# Patient Record
Sex: FEMALE | Race: WHITE
[De-identification: names, ages, dates, MRNs, and addresses within clinical notes are randomized per-mention and may not be internally consistent; named-entity substitution may affect disease eponyms.]

---

## 2019-11-05 ENCOUNTER — HOSPITAL ENCOUNTER (INPATIENT)
Dept: HOSPITAL 75 - ER FS | Age: 74
LOS: 2 days | Discharge: HOME | DRG: 308 | End: 2019-11-07
Attending: INTERNAL MEDICINE | Admitting: INTERNAL MEDICINE
Payer: MEDICARE

## 2019-11-05 VITALS — HEIGHT: 60.98 IN | BODY MASS INDEX: 27.22 KG/M2 | WEIGHT: 144.18 LBS

## 2019-11-05 DIAGNOSIS — Z95.5: ICD-10-CM

## 2019-11-05 DIAGNOSIS — E03.9: ICD-10-CM

## 2019-11-05 DIAGNOSIS — I08.1: ICD-10-CM

## 2019-11-05 DIAGNOSIS — I11.0: ICD-10-CM

## 2019-11-05 DIAGNOSIS — G89.29: ICD-10-CM

## 2019-11-05 DIAGNOSIS — E83.42: ICD-10-CM

## 2019-11-05 DIAGNOSIS — E78.00: ICD-10-CM

## 2019-11-05 DIAGNOSIS — M54.9: ICD-10-CM

## 2019-11-05 DIAGNOSIS — I48.0: Primary | ICD-10-CM

## 2019-11-05 DIAGNOSIS — I25.2: ICD-10-CM

## 2019-11-05 DIAGNOSIS — J44.1: ICD-10-CM

## 2019-11-05 DIAGNOSIS — E87.6: ICD-10-CM

## 2019-11-05 DIAGNOSIS — I25.10: ICD-10-CM

## 2019-11-05 DIAGNOSIS — I25.5: ICD-10-CM

## 2019-11-05 DIAGNOSIS — I50.23: ICD-10-CM

## 2019-11-05 DIAGNOSIS — M19.90: ICD-10-CM

## 2019-11-05 DIAGNOSIS — Z95.810: ICD-10-CM

## 2019-11-05 DIAGNOSIS — I95.9: ICD-10-CM

## 2019-11-05 DIAGNOSIS — Z91.14: ICD-10-CM

## 2019-11-05 DIAGNOSIS — K21.9: ICD-10-CM

## 2019-11-05 PROCEDURE — 36415 COLL VENOUS BLD VENIPUNCTURE: CPT

## 2019-11-05 PROCEDURE — 84484 ASSAY OF TROPONIN QUANT: CPT

## 2019-11-05 PROCEDURE — 83735 ASSAY OF MAGNESIUM: CPT

## 2019-11-05 PROCEDURE — 85027 COMPLETE CBC AUTOMATED: CPT

## 2019-11-05 PROCEDURE — 85610 PROTHROMBIN TIME: CPT

## 2019-11-05 PROCEDURE — 71045 X-RAY EXAM CHEST 1 VIEW: CPT

## 2019-11-05 PROCEDURE — 81000 URINALYSIS NONAUTO W/SCOPE: CPT

## 2019-11-05 PROCEDURE — 85007 BL SMEAR W/DIFF WBC COUNT: CPT

## 2019-11-05 PROCEDURE — 85025 COMPLETE CBC W/AUTO DIFF WBC: CPT

## 2019-11-05 PROCEDURE — 93005 ELECTROCARDIOGRAM TRACING: CPT

## 2019-11-05 PROCEDURE — 94760 N-INVAS EAR/PLS OXIMETRY 1: CPT

## 2019-11-05 PROCEDURE — 80053 COMPREHEN METABOLIC PANEL: CPT

## 2019-11-05 PROCEDURE — 94761 N-INVAS EAR/PLS OXIMETRY MLT: CPT

## 2019-11-05 PROCEDURE — 93306 TTE W/DOPPLER COMPLETE: CPT

## 2019-11-05 PROCEDURE — 84100 ASSAY OF PHOSPHORUS: CPT

## 2019-11-05 PROCEDURE — 83036 HEMOGLOBIN GLYCOSYLATED A1C: CPT

## 2019-11-05 PROCEDURE — 84443 ASSAY THYROID STIM HORMONE: CPT

## 2019-11-05 PROCEDURE — 83880 ASSAY OF NATRIURETIC PEPTIDE: CPT

## 2019-11-05 PROCEDURE — 94640 AIRWAY INHALATION TREATMENT: CPT

## 2019-11-05 PROCEDURE — 80048 BASIC METABOLIC PNL TOTAL CA: CPT

## 2019-11-05 PROCEDURE — 85730 THROMBOPLASTIN TIME PARTIAL: CPT

## 2019-11-05 PROCEDURE — 82962 GLUCOSE BLOOD TEST: CPT

## 2019-11-05 PROCEDURE — 87081 CULTURE SCREEN ONLY: CPT

## 2019-11-05 PROCEDURE — 93041 RHYTHM ECG TRACING: CPT

## 2019-11-06 VITALS — DIASTOLIC BLOOD PRESSURE: 75 MMHG | SYSTOLIC BLOOD PRESSURE: 134 MMHG

## 2019-11-06 VITALS — DIASTOLIC BLOOD PRESSURE: 65 MMHG | SYSTOLIC BLOOD PRESSURE: 121 MMHG

## 2019-11-06 VITALS — SYSTOLIC BLOOD PRESSURE: 130 MMHG | DIASTOLIC BLOOD PRESSURE: 59 MMHG

## 2019-11-06 VITALS — SYSTOLIC BLOOD PRESSURE: 134 MMHG | DIASTOLIC BLOOD PRESSURE: 75 MMHG

## 2019-11-06 VITALS — DIASTOLIC BLOOD PRESSURE: 60 MMHG | SYSTOLIC BLOOD PRESSURE: 111 MMHG

## 2019-11-06 VITALS — DIASTOLIC BLOOD PRESSURE: 81 MMHG | SYSTOLIC BLOOD PRESSURE: 140 MMHG

## 2019-11-06 VITALS — DIASTOLIC BLOOD PRESSURE: 66 MMHG | SYSTOLIC BLOOD PRESSURE: 126 MMHG

## 2019-11-06 VITALS — SYSTOLIC BLOOD PRESSURE: 138 MMHG | DIASTOLIC BLOOD PRESSURE: 70 MMHG

## 2019-11-06 VITALS — DIASTOLIC BLOOD PRESSURE: 68 MMHG | SYSTOLIC BLOOD PRESSURE: 108 MMHG

## 2019-11-06 VITALS — SYSTOLIC BLOOD PRESSURE: 117 MMHG | DIASTOLIC BLOOD PRESSURE: 42 MMHG

## 2019-11-06 VITALS — DIASTOLIC BLOOD PRESSURE: 63 MMHG | SYSTOLIC BLOOD PRESSURE: 127 MMHG

## 2019-11-06 VITALS — SYSTOLIC BLOOD PRESSURE: 110 MMHG | DIASTOLIC BLOOD PRESSURE: 56 MMHG

## 2019-11-06 VITALS — SYSTOLIC BLOOD PRESSURE: 122 MMHG | DIASTOLIC BLOOD PRESSURE: 59 MMHG

## 2019-11-06 VITALS — SYSTOLIC BLOOD PRESSURE: 141 MMHG | DIASTOLIC BLOOD PRESSURE: 53 MMHG

## 2019-11-06 VITALS — DIASTOLIC BLOOD PRESSURE: 80 MMHG | SYSTOLIC BLOOD PRESSURE: 123 MMHG

## 2019-11-06 VITALS — SYSTOLIC BLOOD PRESSURE: 120 MMHG | DIASTOLIC BLOOD PRESSURE: 70 MMHG

## 2019-11-06 VITALS — SYSTOLIC BLOOD PRESSURE: 106 MMHG | DIASTOLIC BLOOD PRESSURE: 53 MMHG

## 2019-11-06 VITALS — DIASTOLIC BLOOD PRESSURE: 67 MMHG | SYSTOLIC BLOOD PRESSURE: 139 MMHG

## 2019-11-06 VITALS — DIASTOLIC BLOOD PRESSURE: 68 MMHG | SYSTOLIC BLOOD PRESSURE: 136 MMHG

## 2019-11-06 VITALS — SYSTOLIC BLOOD PRESSURE: 136 MMHG | DIASTOLIC BLOOD PRESSURE: 71 MMHG

## 2019-11-06 LAB
ALBUMIN SERPL-MCNC: 3.5 GM/DL (ref 3.2–4.5)
ALP SERPL-CCNC: 82 U/L (ref 40–136)
ALT SERPL-CCNC: 9 U/L (ref 0–55)
APTT BLD: 33 SEC (ref 24–35)
APTT PPP: YELLOW S
BACTERIA #/AREA URNS HPF: (no result) /HPF
BASOPHILS # BLD AUTO: 0 10^3/UL (ref 0–0.1)
BASOPHILS # BLD AUTO: 0.1 10^3/UL (ref 0–0.1)
BASOPHILS NFR BLD AUTO: 0 % (ref 0–10)
BASOPHILS NFR BLD AUTO: 1 % (ref 0–10)
BILIRUB SERPL-MCNC: 0.7 MG/DL (ref 0.1–1)
BILIRUB UR QL STRIP: NEGATIVE
BUN/CREAT SERPL: 13
BUN/CREAT SERPL: 14
CALCIUM SERPL-MCNC: 9 MG/DL (ref 8.5–10.1)
CALCIUM SERPL-MCNC: 9.1 MG/DL (ref 8.5–10.1)
CHLORIDE SERPL-SCNC: 104 MMOL/L (ref 98–107)
CHLORIDE SERPL-SCNC: 108 MMOL/L (ref 98–107)
CO2 SERPL-SCNC: 20 MMOL/L (ref 21–32)
CO2 SERPL-SCNC: 22 MMOL/L (ref 21–32)
CREAT SERPL-MCNC: 1.17 MG/DL (ref 0.6–1.3)
CREAT SERPL-MCNC: 1.23 MG/DL (ref 0.6–1.3)
EOSINOPHIL # BLD AUTO: 0 10^3/UL (ref 0–0.3)
EOSINOPHIL # BLD AUTO: 0.1 10^3/UL (ref 0–0.3)
EOSINOPHIL NFR BLD AUTO: 0 % (ref 0–10)
EOSINOPHIL NFR BLD AUTO: 1 % (ref 0–10)
EOSINOPHIL NFR BLD MANUAL: 1 %
ERYTHROCYTE [DISTWIDTH] IN BLOOD BY AUTOMATED COUNT: 13.2 % (ref 10–14.5)
ERYTHROCYTE [DISTWIDTH] IN BLOOD BY AUTOMATED COUNT: 13.4 % (ref 10–14.5)
FIBRINOGEN PPP-MCNC: CLEAR MG/DL
GFR SERPLBLD BASED ON 1.73 SQ M-ARVRAT: 42 ML/MIN
GFR SERPLBLD BASED ON 1.73 SQ M-ARVRAT: 45 ML/MIN
GLUCOSE SERPL-MCNC: 176 MG/DL (ref 70–105)
GLUCOSE SERPL-MCNC: 185 MG/DL (ref 70–105)
GLUCOSE UR STRIP-MCNC: NEGATIVE MG/DL
HCT VFR BLD CALC: 42 % (ref 35–52)
HCT VFR BLD CALC: 44 % (ref 35–52)
HGB BLD-MCNC: 13.6 G/DL (ref 11.5–16)
HGB BLD-MCNC: 14.2 G/DL (ref 11.5–16)
INR PPP: 1 (ref 0.8–1.4)
KETONES UR QL STRIP: NEGATIVE
LEUKOCYTE ESTERASE UR QL STRIP: (no result)
LYMPHOCYTES # BLD AUTO: 2.1 X 10^3 (ref 1–4)
LYMPHOCYTES # BLD AUTO: 3.8 X 10^3 (ref 1–4)
LYMPHOCYTES NFR BLD AUTO: 18 % (ref 12–44)
LYMPHOCYTES NFR BLD AUTO: 24 % (ref 12–44)
MAGNESIUM SERPL-MCNC: 1.7 MG/DL (ref 1.6–2.4)
MAGNESIUM SERPL-MCNC: 1.8 MG/DL (ref 1.6–2.4)
MANUAL DIFFERENTIAL PERFORMED BLD QL: NO
MANUAL DIFFERENTIAL PERFORMED BLD QL: YES
MCH RBC QN AUTO: 31 PG (ref 25–34)
MCH RBC QN AUTO: 31 PG (ref 25–34)
MCHC RBC AUTO-ENTMCNC: 33 G/DL (ref 32–36)
MCHC RBC AUTO-ENTMCNC: 33 G/DL (ref 32–36)
MCV RBC AUTO: 94 FL (ref 80–99)
MCV RBC AUTO: 95 FL (ref 80–99)
MONOCYTES # BLD AUTO: 0.6 X 10^3 (ref 0–1)
MONOCYTES # BLD AUTO: 1 X 10^3 (ref 0–1)
MONOCYTES NFR BLD AUTO: 5 % (ref 0–12)
MONOCYTES NFR BLD AUTO: 6 % (ref 0–12)
MONOCYTES NFR BLD: 8 %
NEUTROPHILS # BLD AUTO: 10.7 X 10^3 (ref 1.8–7.8)
NEUTROPHILS # BLD AUTO: 9 X 10^3 (ref 1.8–7.8)
NEUTROPHILS NFR BLD AUTO: 68 % (ref 42–75)
NEUTROPHILS NFR BLD AUTO: 77 % (ref 42–75)
NEUTS BAND NFR BLD MANUAL: 68 %
NITRITE UR QL STRIP: NEGATIVE
PH UR STRIP: 6 [PH] (ref 5–9)
PHOSPHATE SERPL-MCNC: 3.1 MG/DL (ref 2.3–4.7)
PLATELET # BLD: 266 10^3/UL (ref 130–400)
PLATELET # BLD: 296 10^3/UL (ref 130–400)
PMV BLD AUTO: 11.9 FL (ref 7.4–10.4)
PMV BLD AUTO: 12 FL (ref 7.4–10.4)
POTASSIUM SERPL-SCNC: 3.5 MMOL/L (ref 3.6–5)
POTASSIUM SERPL-SCNC: 3.5 MMOL/L (ref 3.6–5)
PROT SERPL-MCNC: 6.8 GM/DL (ref 6.4–8.2)
PROT UR QL STRIP: (no result)
PROTHROMBIN TIME: 13.3 SEC (ref 12.2–14.7)
RBC #/AREA URNS HPF: (no result) /HPF
RBC MORPH BLD: NORMAL
SODIUM SERPL-SCNC: 141 MMOL/L (ref 135–145)
SODIUM SERPL-SCNC: 142 MMOL/L (ref 135–145)
SP GR UR STRIP: 1.01 (ref 1.02–1.02)
VARIANT LYMPHS NFR BLD MANUAL: 23 %
WBC # BLD AUTO: 11.7 10^3/UL (ref 4.3–11)
WBC # BLD AUTO: 15.6 10^3/UL (ref 4.3–11)
WBC #/AREA URNS HPF: (no result) /HPF

## 2019-11-06 RX ADMIN — FLUTICASONE PROPIONATE SCH SPRAY: 50 SPRAY, METERED NASAL at 15:34

## 2019-11-06 RX ADMIN — INSULIN ASPART SCH UNIT: 100 INJECTION, SOLUTION INTRAVENOUS; SUBCUTANEOUS at 05:46

## 2019-11-06 RX ADMIN — ALBUTEROL SULFATE SCH MG: 2.5 SOLUTION RESPIRATORY (INHALATION) at 10:27

## 2019-11-06 RX ADMIN — MAGNESIUM SULFATE IN DEXTROSE SCH MLS/HR: 10 INJECTION, SOLUTION INTRAVENOUS at 06:50

## 2019-11-06 RX ADMIN — ALBUTEROL SULFATE SCH MG: 2.5 SOLUTION RESPIRATORY (INHALATION) at 20:22

## 2019-11-06 RX ADMIN — INSULIN ASPART SCH UNIT: 100 INJECTION, SOLUTION INTRAVENOUS; SUBCUTANEOUS at 11:47

## 2019-11-06 RX ADMIN — ALBUTEROL SULFATE SCH MG: 2.5 SOLUTION RESPIRATORY (INHALATION) at 21:25

## 2019-11-06 RX ADMIN — FLUTICASONE PROPIONATE AND SALMETEROL XINAFOATE SCH PUFF: 115; 21 AEROSOL, METERED RESPIRATORY (INHALATION) at 21:25

## 2019-11-06 RX ADMIN — LORATADINE SCH MG: 10 TABLET ORAL at 09:10

## 2019-11-06 RX ADMIN — APIXABAN SCH MG: 5 TABLET, FILM COATED ORAL at 19:59

## 2019-11-06 RX ADMIN — FLUTICASONE PROPIONATE AND SALMETEROL XINAFOATE SCH PUFF: 115; 21 AEROSOL, METERED RESPIRATORY (INHALATION) at 06:22

## 2019-11-06 RX ADMIN — ALBUTEROL SULFATE SCH MG: 2.5 SOLUTION RESPIRATORY (INHALATION) at 06:20

## 2019-11-06 RX ADMIN — MAGNESIUM SULFATE IN DEXTROSE SCH MLS/HR: 10 INJECTION, SOLUTION INTRAVENOUS at 09:12

## 2019-11-06 RX ADMIN — CARVEDILOL SCH MG: 12.5 TABLET, FILM COATED ORAL at 20:00

## 2019-11-06 RX ADMIN — ALBUTEROL SULFATE SCH MG: 2.5 SOLUTION RESPIRATORY (INHALATION) at 14:22

## 2019-11-06 NOTE — PULMONARY PROGRESS NOTE
Subjective


Date Seen by a Provider:  Nov 6, 2019


Time Seen by a Provider:  03:35


Subjective/Events-last exam


Patient is a 74 year old female with a past medical history of COPD, 

hypothryroidism, Afib, CAD s/p stent placement x3 over 5 years ago, and 

defibrillator and pacemaker placement who presented to the [facility] ED 11/5/19

after feeling as though her pacemaker was firing and she was having a heart 

attack.   She states that she had a single episode of nausea with associated 

vomiting and felt like she was having a heart attack and her pacemaker was 

firing.  She also experienced shortness of breath at this time.  She denies 

diaphoresis lower extremity edema, and current cough, although she did have a 

cold a few weeks ago.  She denies any lower extremity edema.  She reports that 

she has not been taking her medications for the past week, as she moved in with 

her daughter and she has not reestablished care.  She was previously on 3L of O2

at home, but she has not been on any since moving.  Cardizem and Lovenox were 

administered in the ED, troponins negative, BNP 9776.





Sepsis Event


Evaluation


Height, Weight, BMI


Height: '"


Weight: lbs. oz. kg; 25.58 BMI


Method:





Exam


Exam





Vital Signs








  Date Time  Temp Pulse Resp B/P (MAP) Pulse Ox O2 Delivery O2 Flow Rate FiO2


 


11/6/19 02:23 36.8 85 18 134/75 (94) 95 Nasal Cannula 2.00 


 


11/6/19 01:14 36.7 92 12 127/67 97 Nasal Cannula 2.00 


 


11/6/19 00:24 36.97136 123 45 120/72  Room Air 2.00 


 


11/6/19 00:07       2.0 


 


11/6/19 00:07     97 Nasal Cannula 2.00 


 


11/5/19 23:56 36.8 140 12 120/72 (88) 97 Nasal Cannula  














I & O 


 


 11/6/19





 07:00


 


Intake Total 300 ml


 


Balance 300 ml








Height & Weight


Height: '"


Weight: lbs. oz. kg; 25.58 BMI


Method:


General Appearance:  No Apparent Distress, Thin


HEENT:  PERRL/EOMI


Neck:  Full Range of Motion, Non Tender, Supple


Respiratory:  Chest Non Tender, No Accessory Muscle Use, No Respiratory 

Distress, Crackles (bases), Decreased Breath Sounds, Rhonci (bases)


Cardiovascular:  Regular Rate, Rhythm, No JVD, Normal Peripheral Pulses


Capillary Refill:  Less Than 3 Seconds


Peripheral Pulses:  2+ Dorsalis Pedis (R), 2+ Left Dors-Pedis (L), 2+ Radial 

Pulses (R), 2+ Radial Pulses (L)


Gastrointestinal:  normal bowel sounds, non tender, soft


Extremity:  Normal Range of Motion, Non Tender, No Pedal Edema


Neurologic/Psychiatric:  Alert, Oriented x3, No Motor/Sensory Deficits


Skin:  Normal Color, Warm/Dry





Results


Lab





11/6/19 00:05








BNP 9776





Assessment/Plan


Assessment/Plan


Afib with RVR 


   -Cardizem drip started in ED


   -Lovenox


Acute CHF exacerbation


   -BNP 9776


   -Consult cardiology 


   -echo 


   -Lasix 40mg, monitor electrolytes 


Hypokalemia 


   -replace with 50meq 


Hypomagnesium


   -replace with 4gm 


Hx hypotension 


   -currently normotensive 


Hx of hypothyroid 


   -check TSH


Hx of COPD 


   -previously on 3L home O2











NICK ROSS MED STUDENT     Nov 6, 2019 03:40


POS

## 2019-11-06 NOTE — DIAGNOSTIC IMAGING REPORT
Portable erect AP chest at 1210 hours.



INDICATION: Cough.



There are no prior studies available for comparison.



FINDINGS: The heart is enlarged and there is a defibrillator

device in place on the left. The leads seem to be in good

position. The lungs are clear. There is no evidence for failure,

pneumonia or for pleural effusion. The mediastinum is not

widened. The osseous structures are intact. Surgical clips are

evident over the left upper quadrant and the right mid abdomen.



IMPRESSION:



There is cardiomegaly but there is no evidence for an acute

cardiopulmonary abnormality.



Dictated by: 



  Dictated on workstation # [id number]

## 2019-11-06 NOTE — NUR
This nurse called SBAR report to Diana RN on 4th floor, patient is going to be 
transferring to room 418. Patient will be transferred with telemetry.l

## 2019-11-06 NOTE — HISTORY & PHYSICAL-HOSPITALIST
History of Present Illness


HPI/Chief Complaint


Pt is a 74yoCF with a PMH of a-fib, CHF, AICD placement, and COPD who presented 

to the ER due to her defibrillator shocking her.  Patient states she has had a 

cough and shortness of breath for about a week.  Last night she had 4 episodes 

of sharp shocklike chest pain and she thought that her defibrillator shocking 

her.  After the fourth episode her daughter called EMS for evaluation.  She has 

no history of aberrant defibrillations.  On EMS arrival she was found to be in 

A. fib with RVR and was given 20 mg of Cardizem.  On arrival to the emergency 

room she remains in rapid ventricular rate and was started on a cardizem gtt. 

Her rate reamined elevated and she was then given digoxin x1. She was then 

transferred here for ICU level care and cardiology evaluation.  She reports that

she recently moved from [place] to [place] and has not established care 

with any physicians.  Because of this she has been out of her medications for 

which she states is a couple of weeks. Reviewed of fill history shows she's been

out of medications for over a month though. She is also unsure of any of her 

medications. At this time she states she is feeling well and is actually 

requesting discharge home to be with her dog.


Source:  patient


Date Seen


11/6/19


Time Seen by a Provider:  08:15


Attending Physician


Fred Meyers MD


PCP


No,Local Physician


Referring Physician





Date of Admission


Nov 6, 2019 at 01:19





Home Medications & Allergies


Home Medications


Reviewed patient Home Medication Reconciliation performed by pharmacy medication

reconciliations technician and/or nursing.


Patients Allergies have been reviewed.





Allergies





Allergies


Coded Allergies


  No Known Drug Allergies ([id number]/6/19)








Past Medical-Social-Family Hx


Past Med/Social Hx:  Reviewed Nursing Past Med/Soc Hx


Patient Social History


Employed/Student:  retired


Alcohol Use:  Denies Use


Recreational Drug Use:  No


Smoking Status:  Former Smoker


Physical Abuse Screen:  No


Sexual Abuse:  No


Recent Foreign Travel:  No


Contact w/other who traveled:  No


Recent Hopitalizations:  No


Recent Infectious Disease Expo:  No





Immunizations Up To Date


Date of Pneumonia Vaccine:  Nov 1, 2017





Seasonal Allergies


Seasonal Allergies:  Yes





Past Medical History


Surgeries:  Coronary Stent, Hysterectomy, Pacemaker


Respiratory:  COPD


Currently Using CPAP:  No


Currently Using BIPAP:  No


Cardiac:  Atrial Fibrillation, Cardiomyopathy, Coronary Artery Disease, 

Hypertension, Irregular Heartbeat


Hysterectomy


Gastrointestinal:  Gastroesophageal Reflux, Chronic Diarrhea


Musculoskeletal:  Arthritis, Chronic Back Pain


Endocrine:  Hypothyroidsim


History of Blood Disorders:  No





Family History


Reviewed Nursing Family Hx


Cancer





Review of Systems


Constitutional:  no symptoms reported


EENTM:  no symptoms reported


Respiratory:  No cough, No dyspnea on exertion, No short of breath


Cardiovascular:  see HPI; No chest pain, No edema; Hx of Intervention; No 

palpitations


Gastrointestinal:  no symptoms reported; No abdominal pain


Musculoskeletal:  no symptoms reported


Skin:  no symptoms reported


Psychiatric/Neurological:  No Symptoms Reported





Physical Exam


Physical Exam


Vital Signs





Vital Signs - First Documented








 11/5/19 11/6/19





 23:56 03:32


 


Temp 36.8 


 


Pulse 140 


 


Resp 12 


 


B/P (MAP) 120/72 (88) 


 


Pulse Ox 97 


 


O2 Delivery Nasal Cannula 


 


FiO2  28





Capillary Refill : Less Than 3 Seconds


Height, Weight, BMI


Height: '"


Weight: lbs. oz. kg; 25.58 BMI


Method:


General Appearance:  No Apparent Distress, WD/WN, Chronically ill, Thin


Neck:  Normal Inspection, Supple; No Thyromegaly


Respiratory:  Lungs Clear, No Accessory Muscle Use, No Respiratory Distress


Cardiovascular:  Regular Rate, Rhythm, No Murmur


Gastrointestinal:  Normal Bowel Sounds, Non Tender, Soft


Extremity:  No Calf Tenderness, No Pedal Edema


Neurologic/Psychiatric:  Alert, Oriented x3, Normal Mood/Affect


Skin:  Normal Color, Warm/Dry





Results


Results/Procedures


Labs


Laboratory Tests


11/6/19 00:05








11/6/19 03:15








Patient resulted labs reviewed.


Imaging:  Reviewed Imaging Report





Assessment/Plan


Admission Diagnosis


a-fib with RVR


Admission Status:  Inpatient Order (span 2 midnights)


Reason for Inpatient Admission:  


defibrillator shock, on cardizem gtt, need cardiology evaluation





Assessment and Plan


Atrial fibrillation with RVR


Ischemic Cardiomyopathy


   Converted to sinus rhythm en route so off cardizem gtt


   On Xarelto


   Cardiology consulted, appreciate recs


   Echo ordered


   interrogate pacemaker


   





HTN


   Well controlled, trend





COPD


   Does not wear oxygen at home, I titrated off at bedside and tolerated well


   MAT protocol


   Pulm consulted, appreciate recs


   Advair





Medication noncompliance


    consulted


   Discussed with [facility] who called pharmacy and refills available, she 

will have them transferred to [facility] here, appreciate assistance





DVT ppx: anticoagulation as above





Diagnosis/Problems


Diagnosis/Problems





(1) Atrial fibrillation with RVR


Status:  Acute


(2) COPD (chronic obstructive pulmonary disease)


Qualifiers:  


   COPD type:  unspecified COPD  Qualified Codes:  J44.9 - Chronic obstructive 

pulmonary disease, unspecified


(3) CAD (coronary artery disease)


Status:  Chronic


Qualifiers:  


   Coronary Disease-Associated Artery/Lesion type:  native artery  Native vs. 

transplanted heart:  native heart  Associated angina:  without angina  Qualified

Codes:  I25.10 - Atherosclerotic heart disease of native coronary artery without

angina pectoris


(4) Hypothyroidism


Status:  Chronic


Qualifiers:  


   Hypothyroidism type:  unspecified  Qualified Codes:  E03.9 - Hypothyroidism, 

unspecified


(5) Non-compliance


Status:  Acute


(6) CHF (congestive heart failure)


Status:  Chronic


Qualifiers:  


   Heart failure type:  systolic  Heart failure chronicity:  chronic  Qualified 

Codes:  I50.22 - Chronic systolic (congestive) heart failure





Clinical Quality Measures


AMI/AHF:


ASA po Prior to arrival:  No





DVT/VTE Risk/Contraindication:


RFS Level Per Nursing on Admit:  2=Moderate











SMAANTHA WALTON MD               Nov 6, 2019 13:01


POS

## 2019-11-06 NOTE — NUR
Received report from ERIC Allison on this patient. Patient arrived on the floor at 1500. I agree 
with previous nurse's assessment, assumed care of patient at this time.

## 2019-11-06 NOTE — ED CARDIAC GENERAL
History of Present Illness


General


Chief Complaint:  Chest Pain


Stated Complaint:  CHEST PAIN


Source:  patient, EMS





History of Present Illness


Date Seen by Provider:  Nov 5, 2019


Time Seen by Provider:  23:51


Initial Comments


74-year-old female presenting with increased cough and shortness of breath over 

the last week as she has been out of her medications. She recently moved here 

from [facility]. She is living with her daughter. She has not 

established with a new physician yet. She has a history of COPD, hypothyroidism,

atrial fibrillation with a implanted defibrillator and pacemaker, heart failure,

history of 3 stents in her heart. She reports coughing up thick yellow sputum in

the last several days. She used to be on oxygen but says that they took it away 

from her a few months ago. She felt like her defibrillator was firing on her ea

rlier this evening. EMS was activated to come see her when the defibrillator was

firing. Family history given her nitroglycerin for her defibrillator firing but 

it did not help. She was given 20 mg of diltiazem when she was found to be in 

atrial fibrillation with RVR. She was still in A. fib with RVR on arrival to the

emergency department. She denies having any pain in her chest but states that 

she does have tightness and has been wheezing and short of breath. She has no 

swelling in her legs.





Allergies and Home Medications


Allergies


Coded Allergies:  


     No Known Drug Allergies (Unverified , 11/6/19)





Patient Home Medication List


Home Medication List Reviewed:  Yes





Review of Systems


Review of Systems


Constitutional:  No chills, No fever; malaise


EENTM:  No Nose Congestion, No Nose Pain


Respiratory:  Cough, Shortness of Air, SOA With Exertion, SOA at Rest; Denies 

Stridor; Wheezing


Cardiovascular:  Chest Pain (tightness); Denies Edema; Irregular Heart Rate, 

Palpitations; Denies Syncope


Gastrointestinal:  Denies Nausea, Denies Vomiting


Genitourinary:  No Symptoms Reported


Musculoskeletal:  no symptoms reported


Skin:  no symptoms reported


Psychiatric/Neurological:  No Symptoms Reported


Endocrine:  No Symptoms Reported





Past Medical-Social-Family Hx


Patient Social History


Recent Foreign Travel:  No


Contact w/Someone Who Travel:  No





Past Medical History


Surgeries:  Yes


Coronary Stent, Defibrillator


Respiratory:  Yes


COPD


Cardiac:  Yes


Atrial Fibrillation, Coronary Artery Disease, High Cholesterol, Hypertension, 

Irregular Heartbeat


Endocrine:  Yes


Hypothyroidsim





Physical Exam


Vital Signs





Vital Signs - First Documented








 11/5/19





 23:56


 


Temp 36.8


 


Pulse 140


 


Resp 12


 


B/P (MAP) 120/72 (88)


 


Pulse Ox 97


 


O2 Delivery Nasal Cannula





Capillary Refill :


Height, Weight, BMI


Height: '"


Weight: lbs. oz. kg;  BMI


Method:


General Appearance:  No Apparent Distress, Thin


HEENT:  PERRL/EOMI, Pharynx Normal


Neck:  Full Range of Motion, Normal Inspection, Non Tender, Supple


Respiratory:  Chest Non Tender, No Accessory Muscle Use, No Respiratory 

Distress, Crackles (bases), Decreased Breath Sounds, Rhonci (bases)


Cardiovascular:  No JVD, Normal Peripheral Pulses, Irregularly Irregular, 

Tachycardia


Gastrointestinal:  Normal Bowel Sounds, No Pulsatile Mass, Non Tender, Soft


Rectal:  Deferred


Extremity:  Normal Range of Motion, Non Tender, No Calf Tenderness


Neurologic/Psychiatric:  Alert, Oriented x3, No Motor/Sensory Deficits


Skin:  Normal Color, Warm/Dry





Progress/Results/Core Measures


Results/Orders


Lab Results





Laboratory Tests








Test


 11/6/19


00:05 Range/Units


 


 


White Blood Count


 15.6 H


 4.3-11.0


10^3/uL


 


Red Blood Count


 4.59 


 4.35-5.85


10^6/uL


 


Hemoglobin 14.2  11.5-16.0  G/DL


 


Hematocrit 44  35-52  %


 


Mean Corpuscular Volume 95  80-99  FL


 


Mean Corpuscular Hemoglobin 31  25-34  PG


 


Mean Corpuscular Hemoglobin


Concent 33 


 32-36  G/DL





 


Red Cell Distribution Width 13.2  10.0-14.5  %


 


Platelet Count


 296 


 130-400


10^3/uL


 


Mean Platelet Volume 12.0 H 7.4-10.4  FL


 


Neutrophils (%) (Auto) 68  42-75  %


 


Lymphocytes (%) (Auto) 24  12-44  %


 


Monocytes (%) (Auto) 6  0-12  %


 


Eosinophils (%) (Auto) 1  0-10  %


 


Basophils (%) (Auto) 1  0-10  %


 


Neutrophils # (Auto) 10.7 H 1.8-7.8  X 10^3


 


Lymphocytes # (Auto) 3.8  1.0-4.0  X 10^3


 


Monocytes # (Auto) 1.0  0.0-1.0  X 10^3


 


Eosinophils # (Auto)


 0.1 


 0.0-0.3


10^3/uL


 


Basophils # (Auto)


 0.1 


 0.0-0.1


10^3/uL


 


Neutrophils % (Manual) 68   %


 


Lymphocytes % (Manual) 23   %


 


Monocytes % (Manual) 8   %


 


Eosinophils % (Manual) 1   %


 


Blood Morphology Comment NORMAL   


 


Prothrombin Time 13.3  12.2-14.7  SEC


 


INR Comment 1.0  0.8-1.4  


 


Activated Partial


Thromboplast Time 33 


 24-35  SEC





 


Sodium Level 142  135-145  MMOL/L


 


Potassium Level 3.5 L 3.6-5.0  MMOL/L


 


Chloride Level 104    MMOL/L


 


Carbon Dioxide Level 22  21-32  MMOL/L


 


Anion Gap 16 H 5-14  MMOL/L


 


Blood Urea Nitrogen 15  7-18  MG/DL


 


Creatinine


 1.17 


 0.60-1.30


MG/DL


 


Estimat Glomerular Filtration


Rate 45 


  





 


BUN/Creatinine Ratio 13   


 


Glucose Level 176 H   MG/DL


 


Calcium Level 9.0  8.5-10.1  MG/DL


 


Corrected Calcium 9.4  8.5-10.1  MG/DL


 


Magnesium Level 1.8  1.6-2.4  MG/DL


 


Total Bilirubin 0.7  0.1-1.0  MG/DL


 


Aspartate Amino Transf


(AST/SGOT) 14 


 5-34  U/L





 


Alanine Aminotransferase


(ALT/SGPT) 9 


 0-55  U/L





 


Alkaline Phosphatase 82    U/L


 


Troponin I < 0.30  <0.30  NG/ML


 


Pro-B-Type Natriuretic Peptide 9776.0 H <75.0  PG/ML


 


Total Protein 6.8  6.4-8.2  GM/DL


 


Albumin 3.5  3.2-4.5  GM/DL








My Orders





Orders - KATHY CARTAGENA MD


Cbc With Automated Diff (11/6/19 00:04)


Magnesium (11/6/19 00:04)


Chest 1 View Ap/Pa Only (11/6/19 00:04)


Ekg Tracing (11/6/19 00:04)


Comprehensive Metabolic Panel (11/6/19 00:04)


Protime With Inr (11/6/19 00:04)


Partial Thromboplastin Time (11/6/19 00:04)


O2 (11/6/19 00:04)


Monitor-Rhythm Ecg Trace Only (11/6/19 00:04)


Ed Iv/Invasive Line Start (11/6/19 00:04)


Troponin I Fs (11/6/19 00:04)


Probnp Fs (11/6/19 00:04)


Sputum Culture (11/6/19 00:04)


Ns (Ivpb) (Sodium C... W/Diltiazem Iv Fo (11/6/19 00:06)


Enoxaparin Injection (Lovenox Injection) (11/6/19 00:14)


Manual Differential (11/6/19 00:05)


Digoxin Injection (Lanoxin Injection) (11/6/19 00:50)





Vital Signs/I&O











 11/5/19 11/6/19 11/6/19 11/6/19





 23:56 00:07 00:07 00:24


 


Temp 36.8   36.23540


 


Pulse 140   123


 


Resp 12   45


 


B/P (MAP) 120/72 (88)   120/72


 


Pulse Ox 97 97  


 


O2 Delivery Nasal Cannula Nasal Cannula  Room Air


 


O2 Flow Rate  2.00 2.0 2.00


 


    





 11/6/19   





 01:14   


 


Temp 36.7   


 


Pulse 92   


 


Resp 12   


 


B/P (MAP) 127/67   


 


Pulse Ox 97   


 


O2 Delivery Nasal Cannula   


 


O2 Flow Rate 2.00   














 11/6/19





 00:00


 


Intake Total 300 ml


 


Balance 300 ml











Progress


Progress Note #1:  


Progress Note


Obtain labs as well as cardiac enzymes, electrocardiogram, chest x-ray. Since 

she has not had good rate control with the bolus of diltiazem 20 mg IV by EMS 

Will place her on the trip. She has not had any of her cardiac medications for 

over a week. She also has had recent upper respiratory infection and cough for 

COPD-type exacerbation which could also be a source for making her go into 

atrial fibrillation with RVR. Her heart rate is currently fluctuating anywhere 

from 100-160 bpm


Progress Note #2:  


Progress Note


Labs show an elevated BNP. The troponin is negative. Her CBC does show mild 

elevation of her white blood cell count but the differential was normal. Her 

chemistry is showing mild decrease in her potassium at 3.5. Her creatinine is 

mildly elevated at 1.17 with a GFR 45. Her chest x-ray shows increased pulmonary

vascular congestion and COPD changes with cardiomegaly. Will discuss with Dr. Meyers the on-call doctor for the hospitalist, since the patient is unassigned 

and does not have a local doctor, for the admission. He accepted the patient for

admission. Will give a dose of digoxin in addition to the diltiazem drip since 

the diltiazem drip alone is not controlling her rate. She has been given a dose 

of Lovenox to help with thinning her blood. Consult Dr. Diamond with Cardiology 

in the AM.


Initial ECG Impression Date:  Nov 5, 2019


Initial ECG Impression Time:  23:56


Initial ECG Rate:  100


Initial ECG Rhythm:  A Fib/Flutter


Initial ECG Comparisson:  No Previous ECG Available


Comment


Atrial fibrillation with a left bundle-branch block. Heart rate 100 bpm. QT 

interval 372 ms and a QT corrected interval 480 ms. There are no prior tracing 

available for comparison.





Diagnostic Imaging





   Diagonstic Imaging:  Xray


   Plain Films/CT/US/NM/MRI:  chest


Comments


On my review of her 1 view chest x-ray she has increased pulmonary vascular 

congestion, cardiomegaly, COPD changes with flattening of her diaphragms.


   Reviewed:  Reviewed by Me





Departure


Communication (Admissions)


Time/Spoke to Admitting Phy:  00:47


Discussed with Dr. Celaya for the hospitalist service since the patient is 

unassigned and he accepted the patient for admission. Will continue with the 

diltiazem drip and give a dose of digoxin since she has been off of that. Will 

also give a dose of Lasix but will defer that until she is in the ICU since she 

is maintaining good sats and that way she does not have to have a dose of 

diuretic prior to transfer in the ambulance. Will consult cardiology in the a.m.

for further management and establishment of care for the patient.





Impression





   Primary Impression:  


   Atrial fibrillation with RVR


   Additional Impressions:  


   COPD with exacerbation


   CHF exacerbation


   Qualified Codes:  I50.9 - Heart failure, unspecified


Disposition:  09 ADMITTED AS INPATIENT


Condition:  Stable





Admissions


Decision to Admit Reason:  Admit from ER (General)


Decision to Admit/Date:  Nov 6, 2019


Time/Decision to Admit Time:  00:47





Departure-Patient Inst.


Referrals:  


NO,LOCAL PHYSICIAN (PCP)


Primary Care Physician











KATHY CARTAGENA MD                Nov 6, 2019 00:12


POS

## 2019-11-06 NOTE — NUR
CM/SS spoke with the patient in regards to SS consult. Patient does state that she will have 
difficulty getting meds at discharge. She stated she just moved here from [place] and needs to 
get a [place] Medicaid card. She believes her daughter has filled out an application for [place] but 
is unsure. Patient gave permission to speak with her daughter Virginia. Patient also stated 
she had been on Hospice with [facility] in [facility].

## 2019-11-06 NOTE — DIAGNOSTIC IMAGING REPORT
EXAMINATION: Portable erect AP chest at 3:42 AM



INDICATION: Atrial fibrillation



The cardiomegaly and the left-sided defibrillator device seen on

the exam performed earlier today at 12:10 AM are again evident

and no different. The central pulmonary vascularity may be

somewhat more prominent than on the prior study but there is

still no sign of failure, pneumonia or for a significant pleural

effusion. The mediastinum is not widened. The osseous structures

are intact.



IMPRESSION: When compared to the prior study, there has been no

significant change. There is no acute cardiopulmonary abnormality

noted.



Dictated by: 



  Dictated on workstation # [id number]

## 2019-11-06 NOTE — CONSULTATION-CARDIOLOGY
HPI-Cardiology


Cardiology Consultation:


Date of Consultation


19


Time Seen by a Provider:  08:45


Date of Admission


19


Attending Physician


Fred Meyers MD


Admitting Physician


No,Local Physician


Consulting Physician


Gisselle Diamond MD





HPI:


Chief Complaint:


Palpitations


Dyspnea


Ms. Shannon is a 74 year old female admitted to ICU 1 from [facility].  She 

reports she had previously been living in [place], [place] and has recently 

moved in with her daughter in [place], [place].  She states she has been without he

r medications for approx the last 2 weeks d/t running out of her medications and

not having established with a local PCP as of yet.  She states yesterday morning

she began having n/v/d.  She reports progressive gen weakness and dyspnea.  She 

states she felt as though her device had shocked her maybe 3-4 times, but she is

not sure.  She reports developing sharp stabbing chest pain which was constant 

for several hours that would come and go in intensity.  She is currently pain 

free and feels her SOB has improved and the palpitations have resolved.  She 

reports chronic ankle swelling.  She denies any fever or chills.  She states she

take a blood thinner, but is unsure of the name of it or any of her medications.





Review of Systems-Cardiology


Review of Systems


Constitutional:  No chills, No fever; malaise


Eyes:  No vision change


Ears/Nose/Throat:  No epistaxis, No recent hearing loss


Respiratory:  As described under HPI


Cardiovascular:  As described under HPI


Gastrointestinal:  As described under HPI


Genitourinary:  No dysuria, No hematuria


Musculoskeletal:  back pain (chronic)


Skin:  No rash on exposed areas, No ulcerations on exposed areas


Psychiatric/Neurological:  No anxiety, No depression, No focal weakness, No 

syncope


Hematologic:  No bleeding abnormalities





PMH-Social-Family Hx


Patient Social History


Alcohol Use:  Denies Use


Recreational Drug Use:  No


Recent Foreign Travel:  No


Recent Infectious Disease Expo:  No


Hospitalization with Isolation:  Denies


Physical Abuse Screen:  No


Sexual Abuse:  No





Immunizations Up To Date


Date of Pneumonia Vaccine:  2017





Past Medical History


PMH


As described under Assessment.





Family Medical History


Family Medical History:  


She reports no known family h/o CAD or SCD.  She states all her family


members have had cancer.





Allergies and Home Medications


Allergies


Coded Allergies:  


     No Known Drug Allergies (Unverified , 19)





Physical Exam-Cardiology


Physical Exam


Vital Signs/I&O











 19





 23:56 00:07 00:07 00:24


 


Temp 36.8   [phone]


 


Pulse 140   123


 


Resp 12   45


 


B/P (MAP) 120/72 (88)   120/72


 


Pulse Ox 97 97  


 


O2 Delivery Nasal Cannula Nasal Cannula  Room Air


 


O2 Flow Rate  2.00 2.0 2.00


 


    





 19





 01:14 02:23 02:27 02:30


 


Temp 36.7 36.8  


 


Pulse 92 85 79 


 


Resp 12 18  


 


B/P (MAP) 127/67 134/75 (94)  


 


Pulse Ox 97 95  


 


O2 Delivery Nasal Cannula Nasal Cannula  Nasal Cannula


 


O2 Flow Rate 2.00 2.00  2.00


 


    





 19





 02:30 02:45 03:00 03:15


 


Pulse 77 75 71 67


 


Resp 19 18 18 14


 


B/P (MAP) 138/70 (92) 140/81 (100)  108/68 (81)


 


Pulse Ox 96 97 96 98


 


O2 Delivery Nasal Cannula Nasal Cannula Nasal Cannula Nasal Cannula


 


O2 Flow Rate 2.00 2.00 2.00 2.00





 19





 03:30 03:32 03:45 04:00


 


Temp  36.8  


 


Pulse 65 85 67 94


 


Resp 20  16 10


 


B/P (MAP) 127/63 (84)  136/68 (90) 123/80 (94)


 


Pulse Ox 97 95 96 98


 


O2 Delivery Nasal Cannula  Nasal Cannula Nasal Cannula


 


O2 Flow Rate 2.00  2.00 2.00


 


FiO2  28  


 


    





 19





 04:00 05:00 06:00 06:20


 


Pulse  63 67 


 


Resp  23 15 


 


B/P (MAP)  122/59 (80) 126/66 (86) 


 


Pulse Ox 97 96 96 97


 


O2 Delivery Nasal Cannula Nasal Cannula Nasal Cannula Nasal Cannula


 


O2 Flow Rate 2.00 2.00 2.00 2.00





 19 





 06:55 07:00 08:00 


 


Pulse 64 77 72 


 


Resp  18 22 


 


B/P (MAP)  111/60 (77) 130/59 (82) 


 


Pulse Ox  99 95 


 


O2 Delivery  Nasal Cannula Nasal Cannula 


 


O2 Flow Rate  2.00 2.00 














 19





 00:00


 


Intake Total 300 ml


 


Balance 300 ml





Capillary Refill : Less Than 3 Seconds


Constitutional:  AAO x 3, well-developed, well-nourished


HEENT:  PERRL, hearing is well preserved


Neck:  No carotid bruit; carotid pulses are 2 + bilaterally


Respiratory:  crackles (coarse, bilat)


Cardiovascular:  regular rate-rhythm; No JVD; S1 and S2


Gastrointestinal:  soft, round, audible bowel sounds


Genital/Rectal:  other (indwelling urinary cather to DD; clear, yellow)


Extremities:  no lower extremity edema bilateral


Neurologic/Psychiatric:  grossly intact, power is 5/5 both on sides


Skin:  No rash on exposed areas, No ulcerations on exposed areas





Data Review


Labs


Laboratory Tests


19 00:05: 


White Blood Count 15.6H, Red Blood Count 4.59, Hemoglobin 14.2, Hematocrit 44, 

Mean Corpuscular Volume 95, Mean Corpuscular Hemoglobin 31, Mean Corpuscular 

Hemoglobin Concent 33, Red Cell Distribution Width 13.2, Platelet Count 296, 

Mean Platelet Volume 12.0H, Neutrophils (%) (Auto) 68, Lymphocytes (%) (Auto) 

24, Monocytes (%) (Auto) 6, Eosinophils (%) (Auto) 1, Basophils (%) (Auto) 1, 

Neutrophils # (Auto) 10.7H, Lymphocytes # (Auto) 3.8, Monocytes # (Auto) 1.0, 

Eosinophils # (Auto) 0.1, Basophils # (Auto) 0.1, Neutrophils % (Manual) 68, 

Lymphocytes % (Manual) 23, Monocytes % (Manual) 8, Eosinophils % (Manual) 1, 

Blood Morphology Comment NORMAL, Prothrombin Time 13.3, INR Comment 1.0, 

Activated Partial Thromboplast Time 33, Sodium Level 142, Potassium Level 3.5L, 

Chloride Level 104, Carbon Dioxide Level 22, Anion Gap 16H, Blood Urea Nitrogen 

15, Creatinine 1.17, Estimat Glomerular Filtration Rate 45, BUN/Creatinine Ratio

13, Glucose Level 176H, Calcium Level 9.0, Corrected Calcium 9.4, Magnesium 

Level 1.8, Total Bilirubin 0.7, Aspartate Amino Transf (AST/SGOT) 14, Alanine 

Aminotransferase (ALT/SGPT) 9, Alkaline Phosphatase 82, Troponin I < 0.30, 

Pro-B-Type Natriuretic Peptide 9776.0H, Total Protein 6.8, Albumin 3.5


19 02:31: 


Urine Color YELLOW, Urine Clarity CLEAR, Urine pH 6, Urine Specific Gravity 

1.015L, Urine Protein 3+H, Urine Glucose (UA) NEGATIVE, Urine Ketones NEGATIVE, 

Urine Nitrite NEGATIVE, Urine Bilirubin NEGATIVE, Urine Urobilinogen 4H, Urine 

Leukocyte Esterase 1+H, Urine RBC (Auto) NEGATIVE, Urine RBC NONE, Urine WBC 0-

2, Urine Squamous Epithelial Cells 10-25H, Urine Crystals NONE, Urine Bacteria 

FEWH, Urine Casts NONE, Urine Mucus NEGATIVE, Urine Culture Indicated NO


19 03:15: 


White Blood Count 11.7H, Red Blood Count 4.42, Hemoglobin 13.6, Hematocrit 42, 

Mean Corpuscular Volume 94, Mean Corpuscular Hemoglobin 31, Mean Corpuscular 

Hemoglobin Concent 33, Red Cell Distribution Width 13.4, Platelet Count 266, 

Mean Platelet Volume 11.9H, Neutrophils (%) (Auto) 77H, Lymphocytes (%) (Auto) 

18, Monocytes (%) (Auto) 5, Eosinophils (%) (Auto) 0, Basophils (%) (Auto) 0, 

Neutrophils # (Auto) 9.0H, Lymphocytes # (Auto) 2.1, Monocytes # (Auto) 0.6, 

Eosinophils # (Auto) 0.0, Basophils # (Auto) 0.0, Sodium Level 141, Potassium 

Level 3.5L, Chloride Level 108H, Carbon Dioxide Level 20L, Anion Gap 13, Blood 

Urea Nitrogen 17, Creatinine 1.23, Estimat Glomerular Filtration Rate 42, 

BUN/Creatinine Ratio 14, Glucose Level 185H, Calcium Level 9.1, Magnesium Level 

1.7, Phosphorus Level 3.1, Thyroid Stimulating Hormone (TSH) 2.01





Laboratory Tests


19 00:05








19 03:15











Radiology


NAME:   LUIS A SHANNON


[facility] REC#:   [id number]


ACCOUNT#:   [id number]


PT STATUS:   ADM IN


:   1945


PHYSICIAN:   PAULINA RAMÍREZ DO


ADMIT DATE:   19/ICU


***Draft***


Date of Exam:19





CHEST 1 VIEW, AP/PA ONLY








EXAMINATION: Portable erect AP chest at 3:42 AM





INDICATION: Atrial fibrillation





The cardiomegaly and the left-sided defibrillator device seen on


the exam performed earlier today at 12:10 AM are again evident


and no different. The central pulmonary vascularity may be


somewhat more prominent than on the prior study but there is


still no sign of failure, pneumonia or for a significant pleural


effusion. The mediastinum is not widened. The osseous structures


are intact.





IMPRESSION: When compared to the prior study, there has been no


significant change. There is no acute cardiopulmonary abnormality


noted.





  Dictated on workstation # [id number]








Dict:   [id number]


Trans:   [id number]


[facility] [id number]





Interpreted by:     GORDO CLEMONS MD


Electronically signed by:





A/P-Cardiology


Assessment/Admission Diagnosis


H/O PAF - A-fib with RVR - currently SR





Echocardiogram of 2019 showed LVEF 20-25%.  Mod MR.  Mod TR.  Grade 1 

diastolic dysfunction.  PASP 50mmHg





ICM with acute on chronic systolic CHF





Chronic OAC - unsure of which OAC





H/O MI with stent placement x 3 in  at [facility] - details 

unknown





AICD present - placed in Aug 2014 by Dr. Zepeda at [facility] - 

[employer] - details unknown, likely d/t ICM based on echo carried out 

today





HTN





HLD





COPD - quit smoking approx 40 years ago





N/V/D of undetermined etiology - medical services following





Electrolyte abnormalities - replace





Reports h/o thyroid cancer - details unknown





Poor historian





Discussion and Recomendations


A-fib with RVR - currently SR with controlled rate - reports h/o PAF


Echocardiogram shows LVEF 20-25% - treat with ACE, BB, and diuretics


Defib present - likely d/t ICM - exact details unknown - [place] called to 

interrogate device today


Systolic CHF, likely acute on chronic - treat with diuretics


Continue ASA d/t known h/o CAD with stenting in the past


Replace electrolytes - monitor lab closely


Request records


Further recs will be based on her hospital course


We would like to thank medical services for this consult


 consult





Clinical Quality Measures


AMI/AHF:


ASA po Prior to arrival:  No





DVT/VTE Risk/Contraindication:


RFS Level Per Nursing on Admit:  2=Moderate











BAIMA,JOSE L ARNP            2019 09:23


POS

## 2019-11-06 NOTE — CONSULTATION-CARDIOLOGY
HPI-Cardiology


Cardiology Consultation:


Date of Consultation


11/6/19


Time Seen by a Provider:  09:15


Date of Admission





Attending Physician


Fred Meyers MD


Admitting Physician


No,Local Physician


Consulting Physician


SHIRA RIBEIRO MD, MA, FACP, FACC, FSCAI, CCDS





HPI:


Chief Complaint:


CC: Palpitations, Shortness of breath








HPI


Ms. Pretty is a 74 year old female admitted to ICU 1 from [facility].  She 

reports she had previously been living in [place], [place] and has recently 

moved in with her daughter in [place], [place].  She states she has been without 

her medications for approx the last 2 weeks d/t running out of her medications 

and not having established with a local PCP as of yet.  She states yesterday 

morning she began having n/v/d.  She reports progressive gen weakness and 

dyspnea.  She states she felt as though her device had shocked her maybe 3-4 

times, but she is not sure.  She reports developing sharp stabbing chest pain 

which was constant for several hours that would come and go in intensity.  She 

is currently pain free and feels her SOB has improved and the palpitations have 

resolved.  She reports chronic ankle swelling.  She denies any fever or chills. 

She states she take a blood thinner, but is unsure of the name of it or any of 

her medications.





Review of Systems-Cardiology


Review of Systems


Constitutional:  No chills, No fever; malaise


Eyes:  No vision change


Ears/Nose/Throat:  No epistaxis, No recent hearing loss


Respiratory:  As described under HPI


Cardiovascular:  As described under HPI


Gastrointestinal:  As described under HPI


Genitourinary:  No dysuria, No hematuria


Musculoskeletal:  back pain (chronic)


Skin:  No rash on exposed areas, No ulcerations on exposed areas


Psychiatric/Neurological:  No anxiety, No depression, No focal weakness, No 

syncope


Hematologic:  No bleeding abnormalities





PMH-Social-Family Hx


Patient Social History


Alcohol Use:  Denies Use


Recreational Drug Use:  No


Recent Foreign Travel:  No


Recent Infectious Disease Expo:  No


Hospitalization with Isolation:  Denies


Physical Abuse Screen:  No


Sexual Abuse:  No





Immunizations Up To Date


Date of Pneumonia Vaccine:  Nov 1, 2017





Past Medical History


PMH


As described under Assessment.





Family Medical History


Family Medical History:  


She reports no known family h/o CAD or SCD.  She states all her family


members have had cancer.





Allergies and Home Medications


Allergies


Coded Allergies:  


     No Known Drug Allergies (Unverified , 11/6/19)





Home Medications


Acetaminophen 500 Mg Tablet, 1,000 MG PO Q4H PRN for PAIN-MILD, (Reported)


Amlodipine Besylate 10 Mg Tablet, 10 MG PO DAILY, (Reported)


   LAST FILLED #30 9-10-19 


Carvedilol 12.5 Mg Tablet, 12.5 MG PO BID, (Reported)


   LAST FILLED #60 9-10-19 


Digoxin 125 Mcg Tablet, 125 MCG PO DAILY, (Reported)


   LAST FILLED #30 9-10-19 


Fluticasone Propionate 1 Ea Aero, 2 PUFF IH BID, (Reported)


   LAST FILLED 9-25-19 


Fluticasone Propionate 16 Gm Moclips.susp, 1 SPRAY NS DAILY, (Reported)


   LAST FILLED 9-20-19 


Furosemide 20 Mg Tablet, 20 MG PO DAILY, (Reported)


   LAST FILLED #30 9-10-19 


Levothyroxine Sodium 50 Mcg Tablet, 50 MCG PO DAILY, (Reported)


   LAST FILLED #30 9-10-19 


Loperamide HCl 2 Mg Capsule, PO UD PRN for DIARRHEA, (Reported)


   TAKE 2 CAPSULES AFTER 1ST LOOSE STOOL THEN 1 CAPSULE AFTER EACH UNFORMED STO

OL. NOT TO EXCEED 8 CAPS A DAY. 


Omeprazole 20 Mg Capsule.dr, 20 MG PO DAILY, (Reported)


   LAST FILLED #30 9-10-19 


Potassium Chloride 10 Meq Capsule.er, 10 MEQ PO DAILY, (Reported)


   LAST FILLED #30 9-10-19 


Rivaroxaban 10 Mg Tablet, 10 MG PO DAILY, (Reported)


   LAST FILLED #30 9-10-19 


Sertraline HCl 50 Mg Tablet, 50 MG PO DAILY, (Reported)


   LAST FILLED #30 9-25-19 


Trazodone HCl 50 Mg Tablet, 50 MG PO HS, (Reported)


   LAST FILLED #30 9-10-19 





Patient Home Medication List


Home Medication List Reviewed:  Yes





Physical Exam-Cardiology


Physical Exam


Vital Signs/I&O











 11/6/19 11/6/19 11/6/19 11/6/19





 01:14 02:23 02:27 02:30


 


Temp 36.7 36.8  


 


Pulse 92 85 79 


 


Resp 12 18  


 


B/P (MAP) 127/67 134/75 (94)  


 


Pulse Ox 97 95  


 


O2 Delivery Nasal Cannula Nasal Cannula  Nasal Cannula


 


O2 Flow Rate 2.00 2.00  2.00


 


    





 11/6/19 11/6/19 11/6/19 11/6/19





 02:30 02:45 03:00 03:15


 


Pulse 77 75 71 67


 


Resp 19 18 18 14


 


B/P (MAP) 138/70 (92) 140/81 (100)  108/68 (81)


 


Pulse Ox 96 97 96 98


 


O2 Delivery Nasal Cannula Nasal Cannula Nasal Cannula Nasal Cannula


 


O2 Flow Rate 2.00 2.00 2.00 2.00





 11/6/19 11/6/19 11/6/19 11/6/19





 03:30 03:32 03:45 04:00


 


Temp  36.8  


 


Pulse 65 85 67 94


 


Resp 20  16 10


 


B/P (MAP) 127/63 (84)  136/68 (90) 123/80 (94)


 


Pulse Ox 97 95 96 98


 


O2 Delivery Nasal Cannula  Nasal Cannula Nasal Cannula


 


O2 Flow Rate 2.00  2.00 2.00


 


FiO2  28  


 


    





 11/6/19 11/6/19 11/6/19 11/6/19





 04:00 05:00 06:00 06:20


 


Pulse  63 67 


 


Resp  23 15 


 


B/P (MAP)  122/59 (80) 126/66 (86) 


 


Pulse Ox 97 96 96 97


 


O2 Delivery Nasal Cannula Nasal Cannula Nasal Cannula Nasal Cannula


 


O2 Flow Rate 2.00 2.00 2.00 2.00





 11/6/19 11/6/19 11/6/19 11/6/19





 06:55 07:00 08:00 08:00


 


Pulse 64 77 72 


 


Resp  18 22 


 


B/P (MAP)  111/60 (77) 130/59 (82) 


 


Pulse Ox  99 95 96


 


O2 Delivery  Nasal Cannula Nasal Cannula Room Air


 


O2 Flow Rate  2.00 2.00 





 11/6/19 11/6/19 11/6/19 11/6/19





 09:00 10:00 10:27 11:00


 


Pulse 77 72  64


 


Resp 13 38  29


 


B/P (MAP) 139/67 (91) 120/70 (87)  121/65 (83)


 


Pulse Ox 97 94 94 94


 


O2 Delivery Nasal Cannula Nasal Cannula Room Air Nasal Cannula


 


O2 Flow Rate 2.00 2.00  2.00














 11/6/19





 00:00


 


Intake Total 300 ml


 


Balance 300 ml





Capillary Refill : Less Than 3 Seconds


Constitutional:  AAO x 3, well-developed, well-nourished


HEENT:  PERRL, hearing is well preserved


Neck:  No carotid bruit; carotid pulses are 2 + bilaterally


Respiratory:  crackles (coarse, bilat)


Cardiovascular:  regular rate-rhythm; No JVD; S1 and S2


Gastrointestinal:  soft, round, audible bowel sounds


Genital/Rectal:  other (indwelling urinary cather to DD; clear, yellow)


Extremities:  no lower extremity edema bilateral


Neurologic/Psychiatric:  grossly intact, power is 5/5 both on sides


Skin:  No rash on exposed areas, No ulcerations on exposed areas





Data Review


Labs


Laboratory Tests


11/6/19 00:05: 


White Blood Count 15.6H, Red Blood Count 4.59, Hemoglobin 14.2, Hematocrit 44, 

Mean Corpuscular Volume 95, Mean Corpuscular Hemoglobin 31, Mean Corpuscular 

Hemoglobin Concent 33, Red Cell Distribution Width 13.2, Platelet Count 296, 

Mean Platelet Volume 12.0H, Neutrophils (%) (Auto) 68, Lymphocytes (%) (Auto) 

24, Monocytes (%) (Auto) 6, Eosinophils (%) (Auto) 1, Basophils (%) (Auto) 1, 

Neutrophils # (Auto) 10.7H, Lymphocytes # (Auto) 3.8, Monocytes # (Auto) 1.0, 

Eosinophils # (Auto) 0.1, Basophils # (Auto) 0.1, Neutrophils % (Manual) 68, 

Lymphocytes % (Manual) 23, Monocytes % (Manual) 8, Eosinophils % (Manual) 1, 

Blood Morphology Comment NORMAL, Prothrombin Time 13.3, INR Comment 1.0, 

Activated Partial Thromboplast Time 33, Sodium Level 142, Potassium Level 3.5L, 

Chloride Level 104, Carbon Dioxide Level 22, Anion Gap 16H, Blood Urea Nitrogen 

15, Creatinine 1.17, Estimat Glomerular Filtration Rate 45, BUN/Creatinine Ratio

13, Glucose Level 176H, Calcium Level 9.0, Corrected Calcium 9.4, Magnesium 

Level 1.8, Total Bilirubin 0.7, Aspartate Amino Transf (AST/SGOT) 14, Alanine 

Aminotransferase (ALT/SGPT) 9, Alkaline Phosphatase 82, Troponin I < 0.30, 

Pro-B-Type Natriuretic Peptide 9776.0H, Total Protein 6.8, Albumin 3.5


11/6/19 02:31: 


Urine Color YELLOW, Urine Clarity CLEAR, Urine pH 6, Urine Specific Gravity 

1.015L, Urine Protein 3+H, Urine Glucose (UA) NEGATIVE, Urine Ketones NEGATIVE, 

Urine Nitrite NEGATIVE, Urine Bilirubin NEGATIVE, Urine Urobilinogen 4H, Urine 

Leukocyte Esterase 1+H, Urine RBC (Auto) NEGATIVE, Urine RBC NONE, Urine WBC 0-

2, Urine Squamous Epithelial Cells 10-25H, Urine Crystals NONE, Urine Bacteria 

FEWH, Urine Casts NONE, Urine Mucus NEGATIVE, Urine Culture Indicated NO


11/6/19 03:15: 


White Blood Count 11.7H, Red Blood Count 4.42, Hemoglobin 13.6, Hematocrit 42, 

Mean Corpuscular Volume 94, Mean Corpuscular Hemoglobin 31, Mean Corpuscular 

Hemoglobin Concent 33, Red Cell Distribution Width 13.4, Platelet Count 266, 

Mean Platelet Volume 11.9H, Neutrophils (%) (Auto) 77H, Lymphocytes (%) (Auto) 

18, Monocytes (%) (Auto) 5, Eosinophils (%) (Auto) 0, Basophils (%) (Auto) 0, 

Neutrophils # (Auto) 9.0H, Lymphocytes # (Auto) 2.1, Monocytes # (Auto) 0.6, 

Eosinophils # (Auto) 0.0, Basophils # (Auto) 0.0, Sodium Level 141, Potassium 

Level 3.5L, Chloride Level 108H, Carbon Dioxide Level 20L, Anion Gap 13, Blood 

Urea Nitrogen 17, Creatinine 1.23, Estimat Glomerular Filtration Rate 42, 

BUN/Creatinine Ratio 14, Glucose Level 185H, Calcium Level 9.1, Magnesium Level 

1.7, Phosphorus Level 3.1, Thyroid Stimulating Hormone (TSH) 2.01


11/6/19 11:12: Glucometer 149H








A/P-Cardiology


Assessment/Admission Diagnosis





PAF with RVR - currently SR





ICM with acute on chronic systolic CHF. Echocardiogram of Nov 6, 2019 showed 

LVEF 20-25%.  Mod MR.  Mod TR.  Grade 1 diastolic dysfunction.  PASP 50mmHg





Chronic OAC - pt unsure of which OAC





H/O MI with stent placement x 3 in 2003 at [facility] in [place] - details 

unknown





AICD present - placed in Aug 2014 by Dr. Zepeda at [facility] - 

[place] - details unknown, likely d/t ICM, based on echo carried out 

on 11/6/19





HTN





HLD





COPD - quit smoking approx 40 years ago





N/V/D of undetermined etiology - Medical Services treating





Electrolyte abnormalities - replace





Reports h/o thyroid cancer - details unknown





Poor historian





Discussion and Recomendations





* Treat with ACE, BB, and diuretics


* Defib present - [facility] called to interrogate device today


* Continue ASA d/t known h/o CAD with stenting in the past


* Replace electrolytes - monitor lab closely


* Request records


* Further recs will be based on her hospital course


* We would like to thank Medical Services for this consult


*  consult for help with procuring meds





Clinical Quality Measures


AMI/AHF:


ASA po Prior to arrival:  No





DVT/VTE Risk/Contraindication:


RFS Level Per Nursing on Admit:  2=Moderate











SHIRA RIBEIRO MD FACP FACC CCDS    Nov 6, 2019 12:33


POS

## 2019-11-06 NOTE — NUR
SPOKE WITH THE PATIENT ABOUT HER MEDICATIONS. SHE STATES SHE TAKES SEVERAL MEDS BUT HAS NOT 
FILLED THEM SINCE SHE MOVED HERE TO [place] FROM [place]. SHE USED [facility] 
IN [place], I CALLED AND HAD A LIST FAXED OVER FROM THEM. [phone]. THEY GAVE ME HER 
MEDICARE PART D BILLING INFORMATION AND STATE SHE DOES HAVE REFILLS ON MOST OF HER MEDS.



I WENT OVER THE LIST THEY FAXED OVER WITH THE PATIENT AND SHE VERIFIED HOW SHE TAKES THEM. I 
WENT AHEAD AND CALLED [facility] IN [place] AND GAVE THEM THE PATIENTS INFORMATION AND 
ASKED THEM TO [facility] [place] FROM [place]. THE PATIENT ALSO 
HAD MEDICAID AND SHE THINKS IT IS STILL ACTIVE. [facility] IN [place] IS CAPABLE OF BILLING 
[place] MEDICAID SO I ASKED THEM TO GET THAT INFORMATION FROM [facility] AND TRY TO BILL 
ACCORDINGLY. 



[facility] FILLED: (I NOTED THE PAST DUE FILL DATES ON THE MED REC)

9-25-19 SERTRALINE 50MG DAILY #30 [place]

9-25-19 FLOVENT HFA 110MCG 2 PUFFS BID

9-20-19 FLONASE NASAL SPRAY 1 SPRAY EACH NOSTRIL DAILY

9-11-19 LOPERAMIDE 2MG UD PRN #60

9-10-19 LEVOTHYROXINE 50MCG DAILY #30

9-10-19 TRAZODONE 50MG DAILY #30 (TAKES AT HS)

9-10-19 OMEPRAZOLE 20MG DAILY #30

9-10-19 DIGOXIN 125MCG DAILY #30

9-10-19 COREG 12.5MG BID #60

9-10-19 AMLODIPINE 10MG DAILY #30

9-10-19 XARELTO 10MG DAILY #30

9-10-19 POTASSIUM CHLORIDE CAPS DAILY #30

9-10-19 FUROSEMIDE 20MG DAILY #30

9-3-19 HYDROCODONE 5-325MG BID PRN X 7 DAYS #14 (SHORT TERM, NO LONGER TAKING) 



PATIENT STATES SHE TAKES TYLENOL OTC AS NEEDED.

-------------------------------------------------------------------------------

Addendum: 11/06/19 at 1507 by MELA DEVI CPhT

-------------------------------------------------------------------------------

I SPOKE WITH [facility] IN [place] AT THIS TIME AND VERIFIED THEY WERE ABLE TO GET ALL 
SCRIPTS  EXCEPT THE FLONASE AND LOPERAMIDE TRANSFERRED AND PROCESSED THROUGH HER INSURANCE 
FOR $0.00 COPAY.



THE [facility] ONLY HAD 19 TABS LEFT ON THE SCRIPT SO THEY FILLED THAT FOR ONLY #19 AND SHE SILL 
NEED A NEW SCRIPT.

## 2019-11-06 NOTE — NUR
CARDIZEM DRIP IS AT 15 CC/H. HEART RATE WAS UP . HEART RATE DOWN TO 97 
AFTER THE DIGOXIN WAS GIVEN.

## 2019-11-07 VITALS — DIASTOLIC BLOOD PRESSURE: 56 MMHG | SYSTOLIC BLOOD PRESSURE: 133 MMHG

## 2019-11-07 VITALS — SYSTOLIC BLOOD PRESSURE: 124 MMHG | DIASTOLIC BLOOD PRESSURE: 60 MMHG

## 2019-11-07 VITALS — DIASTOLIC BLOOD PRESSURE: 70 MMHG | SYSTOLIC BLOOD PRESSURE: 146 MMHG

## 2019-11-07 VITALS — DIASTOLIC BLOOD PRESSURE: 61 MMHG | SYSTOLIC BLOOD PRESSURE: 119 MMHG

## 2019-11-07 LAB
BASOPHILS # BLD AUTO: 0 10^3/UL (ref 0–0.1)
BASOPHILS NFR BLD AUTO: 0 % (ref 0–10)
BUN/CREAT SERPL: 15
CALCIUM SERPL-MCNC: 8.9 MG/DL (ref 8.5–10.1)
CHLORIDE SERPL-SCNC: 108 MMOL/L (ref 98–107)
CO2 SERPL-SCNC: 22 MMOL/L (ref 21–32)
CREAT SERPL-MCNC: 1.31 MG/DL (ref 0.6–1.3)
EOSINOPHIL # BLD AUTO: 0.3 10^3/UL (ref 0–0.3)
EOSINOPHIL NFR BLD AUTO: 4 % (ref 0–10)
ERYTHROCYTE [DISTWIDTH] IN BLOOD BY AUTOMATED COUNT: 13.4 % (ref 10–14.5)
GFR SERPLBLD BASED ON 1.73 SQ M-ARVRAT: 39 ML/MIN
GLUCOSE SERPL-MCNC: 111 MG/DL (ref 70–105)
HCT VFR BLD CALC: 39 % (ref 35–52)
HGB BLD-MCNC: 12.4 G/DL (ref 11.5–16)
LYMPHOCYTES # BLD AUTO: 2.5 X 10^3 (ref 1–4)
LYMPHOCYTES NFR BLD AUTO: 36 % (ref 12–44)
MAGNESIUM SERPL-MCNC: 2.1 MG/DL (ref 1.6–2.4)
MANUAL DIFFERENTIAL PERFORMED BLD QL: NO
MCH RBC QN AUTO: 30 PG (ref 25–34)
MCHC RBC AUTO-ENTMCNC: 32 G/DL (ref 32–36)
MCV RBC AUTO: 95 FL (ref 80–99)
MONOCYTES # BLD AUTO: 0.6 X 10^3 (ref 0–1)
MONOCYTES NFR BLD AUTO: 8 % (ref 0–12)
NEUTROPHILS # BLD AUTO: 3.6 X 10^3 (ref 1.8–7.8)
NEUTROPHILS NFR BLD AUTO: 51 % (ref 42–75)
PHOSPHATE SERPL-MCNC: 3.2 MG/DL (ref 2.3–4.7)
PLATELET # BLD: 253 10^3/UL (ref 130–400)
PMV BLD AUTO: 11.3 FL (ref 7.4–10.4)
POTASSIUM SERPL-SCNC: 3.9 MMOL/L (ref 3.6–5)
SODIUM SERPL-SCNC: 140 MMOL/L (ref 135–145)
WBC # BLD AUTO: 7 10^3/UL (ref 4.3–11)

## 2019-11-07 PROCEDURE — 4B02XTZ MEASUREMENT OF CARDIAC DEFIBRILLATOR, EXTERNAL APPROACH: ICD-10-PCS | Performed by: INTERNAL MEDICINE

## 2019-11-07 RX ADMIN — ALBUTEROL SULFATE SCH MG: 2.5 SOLUTION RESPIRATORY (INHALATION) at 02:58

## 2019-11-07 RX ADMIN — FLUTICASONE PROPIONATE SCH SPRAY: 50 SPRAY, METERED NASAL at 08:03

## 2019-11-07 RX ADMIN — LORATADINE SCH MG: 10 TABLET ORAL at 08:01

## 2019-11-07 RX ADMIN — FLUTICASONE PROPIONATE AND SALMETEROL XINAFOATE SCH PUFF: 115; 21 AEROSOL, METERED RESPIRATORY (INHALATION) at 07:52

## 2019-11-07 RX ADMIN — ALBUTEROL SULFATE SCH MG: 2.5 SOLUTION RESPIRATORY (INHALATION) at 11:15

## 2019-11-07 RX ADMIN — APIXABAN SCH MG: 5 TABLET, FILM COATED ORAL at 07:59

## 2019-11-07 RX ADMIN — CARVEDILOL SCH MG: 12.5 TABLET, FILM COATED ORAL at 08:00

## 2019-11-07 RX ADMIN — ALBUTEROL SULFATE SCH MG: 2.5 SOLUTION RESPIRATORY (INHALATION) at 16:10

## 2019-11-07 RX ADMIN — ALBUTEROL SULFATE SCH MG: 2.5 SOLUTION RESPIRATORY (INHALATION) at 07:52

## 2019-11-07 NOTE — PULMONARY PROGRESS NOTE
Subjective


Date Seen by a Provider:  Nov 7, 2019


Time Seen by a Provider:  07:15


Subjective/Events-last exam


No acute events overnight.  Patient states that she is feeling better.  She 

denies any cough, shortness of breath, lower extremity edema, chest pain, and 

palpitations.





Sepsis Event


Evaluation


Height, Weight, BMI


Height: '"


Weight: lbs. oz. kg; 25.58 BMI


Method:





Exam


Exam





Vital Signs








  Date Time  Temp Pulse Resp B/P (MAP) Pulse Ox O2 Delivery O2 Flow Rate FiO2


 


11/7/19 07:02  60      


 


11/7/19 04:00 36.8 60 16 119/61 (80) 97 Nasal Cannula 2.00 


 


11/7/19 02:59     88 Room Air  


 


11/7/19 01:00  60      


 


11/7/19 00:00 36.8 60 20 133/56 (81) 92 Room Air  


 


11/6/19 21:30     96 Room Air  


 


11/6/19 21:25     91 Room Air  


 


11/6/19 20:16  60      


 


11/6/19 20:00     93 Room Air 2.00 


 


11/6/19 19:52 37.4 57 20 141/53 (82) 93 Room Air  


 


11/6/19 19:00  72      


 


11/6/19 16:00 36.4 57 16 136/71 (92) 93 Room Air  


 


11/6/19 14:22     94 Room Air  


 


11/6/19 14:00  60 17 110/56 (74) 94 Nasal Cannula 2.00 


 


11/6/19 13:00  60 24 106/53 (70) 94 Nasal Cannula 2.00 


 


11/6/19 12:56  60      


 


11/6/19 12:00 36.4       


 


11/6/19 12:00  74 19 117/42 (67) 95 Nasal Cannula 2.00 


 


11/6/19 12:00     96 Room Air  


 


11/6/19 11:00  64 29 121/65 (83) 94 Nasal Cannula 2.00 


 


11/6/19 10:27     94 Room Air  


 


11/6/19 10:00  72 38 120/70 (87) 94 Nasal Cannula 2.00 


 


11/6/19 09:00  77 13 139/67 (91) 97 Nasal Cannula 2.00 


 


11/6/19 08:00     96 Room Air  


 


11/6/19 08:00  72 22 130/59 (82) 95 Nasal Cannula 2.00 














I & O 


 


 11/7/19





 07:00


 


Intake Total 3640 ml


 


Output Total 2150 ml


 


Balance 1490 ml








Height & Weight


Height: '"


Weight: lbs. oz. kg; 25.58 BMI


Method:


General Appearance:  No Apparent Distress, WD/WN, Chronically ill


HEENT:  PERRL/EOMI


Neck:  Normal Inspection, Supple; No Thyromegaly


Respiratory:  Lungs Clear, No Accessory Muscle Use, No Respiratory Distress


Cardiovascular:  Regular Rate, Rhythm, No Murmur


Capillary Refill:  Less Than 3 Seconds


Peripheral Pulses:  2+ Dorsalis Pedis (R), 2+ Left Dors-Pedis (L), 2+ Radial 

Pulses (R), 2+ Radial Pulses (L)


Gastrointestinal:  normal bowel sounds, non tender, soft


Extremity:  No Calf Tenderness, No Pedal Edema


Neurologic/Psychiatric:  Alert, Oriented x3, Normal Mood/Affect


Skin:  Normal Color, Warm/Dry





Results


Lab


Laboratory Tests


11/6/19 00:05








11/6/19 03:15








11/7/19 04:10








Radiology


11/6/19 Chest X ray 


IMPRESSION: When compared to the prior study, there has been no


significant change. There is no acute cardiopulmonary abnormality


noted.





Assessment/Plan


Assessment/Plan


Afib with RVR 


   -Currently sinus rhythm


   -Apixaban 5mg BID


   -Cardizem drip D/Cd


   -Lovenox D/Cd


Acute CHF exacerbation


   -BNP 9776


   -Cardiology consulted 


   -echo completed:  LVEF 20-25%, moderate MR and moderate TR


   -Lasix 40mg daily


   - Spironolactone 25mg Daily


   -continue to monitor electrolytes  


Hx hypotension 


   -Ramipril 2.5mg daily


Hx of hypothyroid 


   -TSH 2.01


   -50mcg Levothyroxine


Hx of COPD 


   -previously on 3L home O2


   -Advair BID 


   -Proventil RT Q4hr











NICK ROSS MED STUDENT     Nov 7, 2019 07:20


POS

## 2019-11-07 NOTE — NUR
Message received to call patients daughter Virginia. F/U with daughter et she indicates that 
she has filled out the [place] [employer] application et wants to know what her next steps are. I f/u 
with our financial services Asad et she indicates that there would be a mailing address 
or fax number on the application. I called Virginia back with that information et she was 
thankful and no further needs or questions at this time.

## 2019-11-07 NOTE — DIAGNOSTIC IMAGING REPORT
INDICATION: Atrial fibrillation.



COMPARISON: Comparison made with prior examination from

11/06/2019.



FINDINGS: There is cardiomegaly. Mediastinum is unremarkable.

Pacemaker overlies the left hemithorax. There is no pleural

effusion, pneumothorax, or pneumonia. Mediastinum is

unremarkable.



IMPRESSION:

1. No acute cardiopulmonary abnormality.

2. Cardiomegaly.



Dictated by: 



  Dictated on workstation # [id number]

## 2019-11-07 NOTE — DISCHARGE INST-SIMPLE/STANDARD
Discharge Inst-Standard


Patient Instructions/Follow Up


Plan of Care/Instructions/FU:  


Please continue to take your medications as written. These have all been


called in to [facility] in [facility]. Please establish care with a


primary care physician and also follow up with Dr Diamond.


Activity as Tolerated:  Yes


Discharge Diet:  Low Sodium Diet


Return to The Hospital For:  


Chest pain, shortness of breath, more then 5lb weight gain in 2 days,


fever, defibrillator shock, if you feel you are getting worse.











SAMANTHA WALTON MD               Nov 7, 2019 10:50


POS

## 2019-11-07 NOTE — PROGRESS NOTE - CARDIOLOGY
Cardiology SOAP Progress Note


Subjective:


Feels better today


Less short of breath


No palp, cp, or syncope





Objective:


I&O/Vital Signs











 11/7/19 11/7/19 11/7/19 11/7/19





 02:59 04:00 07:02 07:57


 


Temp  36.8  


 


Pulse  60 60 


 


Resp  16  


 


B/P (MAP)  119/61 (80)  


 


Pulse Ox 88 97  93


 


O2 Delivery Room Air Nasal Cannula  Nasal Cannula


 


O2 Flow Rate  2.00  2.00


 


    





 11/7/19 11/7/19 11/7/19 11/7/19





 08:00 08:05 09:00 11:16


 


Temp 36.2   


 


Pulse 66   


 


Resp 18   


 


B/P (MAP) 146/70 (95)   


 


Pulse Ox 99 98 91 91


 


O2 Delivery Room Air Nasal Cannula Room Air Room Air


 


O2 Flow Rate  2.00  














 11/7/19





 00:00


 


Intake Total 2300 ml


 


Output Total 1400 ml


 


Balance 900 ml








Constitutional:  AAO x 3, well-developed, well-nourished


Respiratory:  crackles (coarse, bilat)


Cardiovascular:  regular rate-rhythm; No JVD; S1 and S2


Gastrointestional:  soft, round, audible bowel sounds


Genital/Rectal:  other (indwelling urinary cather to DD; clear, yellow)


Extremities:  no lower extremity edema bilateral


Neurologic/Psychiatric:  grossly intact, power is 5/5 both on sides


Skin:  No rash on exposed areas, No ulcerations on exposed areas





Results/Procedures:


Labs


Laboratory Tests


11/7/19 04:10: 


White Blood Count 7.0, Red Blood Count 4.07L, Hemoglobin 12.4, Hematocrit 39, 

Mean Corpuscular Volume 95, Mean Corpuscular Hemoglobin 30, Mean Corpuscular 

Hemoglobin Concent 32, Red Cell Distribution Width 13.4, Platelet Count 253, 

Mean Platelet Volume 11.3H, Neutrophils (%) (Auto) 51, Lymphocytes (%) (Auto) 

36, Monocytes (%) (Auto) 8, Eosinophils (%) (Auto) 4, Basophils (%) (Auto) 0, 

Neutrophils # (Auto) 3.6, Lymphocytes # (Auto) 2.5, Monocytes # (Auto) 0.6, 

Eosinophils # (Auto) 0.3, Basophils # (Auto) 0.0, Sodium Level 140, Potassium 

Level 3.9, Chloride Level 108H, Carbon Dioxide Level 22, Anion Gap 10, Blood 

Urea Nitrogen 19H, Creatinine 1.31H, Estimat Glomerular Filtration Rate 39, 

BUN/Creatinine Ratio 15, Glucose Level 111H, Calcium Level 8.9, Phosphorus Level

3.2, Magnesium Level 2.1





Microbiology


11/6/19 MRSA Screen - Final, Complete


          MRSA not isolated





Laboratory Tests


11/6/19 00:05








11/6/19 03:15








11/7/19 04:10











A/P:


Assessment:





PAF with RVR - currently SR





ICM with acute on chronic systolic CHF. Echocardiogram of Nov 6, 2019 showed 

LVEF 20-25%.  Mod MR.  Mod TR.  Grade 1 diastolic dysfunction.  PASP 50mmHg





Chronic OAC - pt unsure of which OAC





H/O MI with stent placement x 3 in 2003 at [facility] - details 

unknown





Dual camber AICD - placed in Aug 2014 by Dr. Zepeda at [facility] - functioning normally on interrogation of 11/6/19 (no 

shocks delivered recently)


HTN





HLD





COPD - quit smoking approx 40 years ago





N/V/D of undetermined etiology - Medical Services treating





Reports h/o thyroid cancer - details unknown





Poor historian


Plan:





* Treat with ACE, BB, diuretics, and ASA


* We interrogated the device (summarized above)


* Continue ASA d/t known h/o CAD with stenting in the past


* I discussed her case with Dr Pennington on the phone today


* We recommend outpt f/u at our office in two weeks of discharge





Clinical Quality Measures


AMI/AHF:


ASA po Prior to arrival:  SHIRA Gonzales MD FACP [facility]    Nov 7, 2019 13:16


POS

## 2019-11-07 NOTE — DISCHARGE SUMMARY
Diagnosis/Chief Complaint


Date of Admission


Nov 6, 2019 at 01:19


Date of Discharge





Discharge Date:  Nov 7, 2019


Admission Diagnosis


a-fib with RVR


Primary Care





Discharge Diagnosis





(1) Atrial fibrillation with RVR


Status:  Acute


(2) COPD (chronic obstructive pulmonary disease)


(3) CAD (coronary artery disease)


Status:  Chronic


(4) Hypothyroidism


Status:  Chronic


(5) Non-compliance


Status:  Acute


(6) CHF (congestive heart failure)


Status:  Chronic





Discharge Summary


Procedures/Consulations


Dr Diamond





Discharge Physical Exam


Allergies:  


Coded Allergies:  


     No Known Drug Allergies (Unverified , 11/6/19)


Vitals & I&Os





Vital Signs








  Date Time  Temp Pulse Resp B/P (MAP) Pulse Ox O2 Delivery O2 Flow Rate FiO2


 


11/7/19 11:16     91 Room Air  


 


11/7/19 08:05       2.00 


 


11/7/19 08:00 36.2 66 18 146/70 (95)    


 


11/6/19 03:32        28








General Appearance:  No Apparent Distress, WD/WN


Respiratory:  Lungs Clear, No Respiratory Distress


Cardiovascular:  Regular Rate, Rhythm, No Murmur


Neurologic/Psychiatric:  Alert, Oriented x3





Hospital Course


Pt was admitted due a-fib with RVR after AICD shocking her at home. She was 

started on cardizem and quickly converted to NSR. She has a history of severe 

systolic dysfunction thus necessitating the AICD and has been off her 

medications for roughly 1 month. She was restarted on medications for her heart 

failure and atrial fibrillation and her AICD was interrogated and is functioning

appropriately. She was discharged home in stable condition. She is to follow upw

ith Dr Diamond in two weeks and to establish care with a PCP in [facility]. Of note

she had been off of her medications as she never transferred her prescriptions 

to a local pharmacy. She was sent new prescriptions to her heart medications and

her other medications were transferred to a local pharmacy in [place] in 

order for her to  there.  was consulted and spoke with 

daughter who now indicated no issues with ability to obtain meds.


Labs (last 24 hrs)


Laboratory Tests


11/7/19 04:10: 


White Blood Count 7.0, Red Blood Count 4.07L, Hemoglobin 12.4, Hematocrit 39, 

Mean Corpuscular Volume 95, Mean Corpuscular Hemoglobin 30, Mean Corpuscular 

Hemoglobin Concent 32, Red Cell Distribution Width 13.4, Platelet Count 253, 

Mean Platelet Volume 11.3H, Neutrophils (%) (Auto) 51, Lymphocytes (%) (Auto) 

36, Monocytes (%) (Auto) 8, Eosinophils (%) (Auto) 4, Basophils (%) (Auto) 0, 

Neutrophils # (Auto) 3.6, Lymphocytes # (Auto) 2.5, Monocytes # (Auto) 0.6, 

Eosinophils # (Auto) 0.3, Basophils # (Auto) 0.0, Sodium Level 140, Potassium 

Level 3.9, Chloride Level 108H, Carbon Dioxide Level 22, Anion Gap 10, Blood 

Urea Nitrogen 19H, Creatinine 1.31H, Estimat Glomerular Filtration Rate 39, 

BUN/Creatinine Ratio 15, Glucose Level 111H, Calcium Level 8.9, Phosphorus Level

3.2, Magnesium Level 2.1





Microbiology


11/6/19 MRSA Screen - Final, Complete


          MRSA not isolated


Patient resulted labs reviewed.


Pending Labs





Imaging:  Reviewed Imaging Report





Discharge


Home Medications:





Active Scripts


Active


Aspirin 81 Mg Tab.chew 81 Mg PO DAILY


Spironolactone 25 Mg Tablet 25 Mg PO DAILY


Altace (Ramipril) 2.5 Mg Cap 2.5 Mg PO DAILY


Carvedilol 12.5 Mg Tablet 25 Mg PO BID


Reported


Tylenol Extra Strength (Acetaminophen) 500 Mg Tablet 1,000 Mg PO Q4H PRN


Furosemide 20 Mg Tablet 20 Mg PO DAILY


     LAST FILLED #30 9-10-19


Potassium Chloride 10 Meq Capsule.er 10 Meq PO DAILY


     LAST FILLED #30 9-10-19


Xarelto (Rivaroxaban) 10 Mg Tablet 10 Mg PO DAILY


     LAST FILLED #30 9-10-19


Amlodipine Besylate 10 Mg Tablet 10 Mg PO DAILY


     LAST FILLED #30 9-10-19


Carvedilol 12.5 Mg Tablet 12.5 Mg PO BID


     LAST FILLED #60 9-10-19


Digoxin 125 Mcg Tablet 125 Mcg PO DAILY


     LAST FILLED #30 9-10-19


Omeprazole 20 Mg Capsule.dr 20 Mg PO DAILY


     LAST FILLED #30 9-10-19


Trazodone HCl 50 Mg Tablet 50 Mg PO HS


     LAST FILLED #30 9-10-19


Levothyroxine Sodium 50 Mcg Tablet 50 Mcg PO DAILY


     LAST FILLED #30 9-10-19


Loperamide (Loperamide HCl) 2 Mg Capsule  PO UD PRN


     TAKE 2 CAPSULES AFTER 1ST LOOSE STOOL THEN 1 CAPSULE AFTER EACH


     UNFORMED STOOL. NOT TO EXCEED 8 CAPS A DAY.


Fluticasone Propionate 16 Gm Spray.susp 1 [place] NS DAILY


     LAST FILLED 9-20-19


Flovent Hfa 110 mcg (Fluticasone Propionate) 1 Ea Aero 2 Puff IH BID


     LAST FILLED 9-25-19


Sertraline HCl 50 Mg Tablet 50 Mg PO DAILY


     LAST FILLED #30 9-25-19





Instructions to patient/family


Please see electronic discharge instructions given to patient.





Clinical Quality Measures


AMI/AHF:


ASA po Prior to arrival:  No





DVT/VTE Risk/Contraindication:


RFS Level Per Nursing on Admit:  2=Moderate





Problem Qualifiers





(1) COPD (chronic obstructive pulmonary disease):  


COPD type:  unspecified COPD  Qualified Codes:  J44.9 - Chronic obstructive 

pulmonary disease, unspecified


(2) CAD (coronary artery disease):  


Coronary Disease-Associated Artery/Lesion type:  native artery  Native vs. 

transplanted heart:  native heart  Associated angina:  without angina  Qualified

Codes:  I25.10 - Atherosclerotic heart disease of native coronary artery without

angina pectoris


(3) Hypothyroidism:  


Hypothyroidism type:  unspecified  Qualified Codes:  E03.9 - Hypothyroidism, 

unspecified


(4) CHF (congestive heart failure):  


Heart failure type:  systolic  Heart failure chronicity:  chronic  Qualified 

Codes:  I50.22 - Chronic systolic (congestive) heart failure








SAMANTHA WALTON MD               Nov 7, 2019 10:51


POS

## 2019-11-07 NOTE — NUR
CM DISCHARGE PLANNING: Patient has been dismissed to home self care on this date. O2 study 
has been placed to determine if she needs o2 on dismissal. Patient indicates that she needs 
to find a primary care provider et does have concerns about her home medications d/t she has 
been out for 2 weeks. Dr. Pennington has worked [employer] to get these medications sent 
to the [facility] in [place] where the pt indicates that she would like to have 
them sent. This was discussed with the patient et she will take her printed out medication 
list to the pharmacy there and go over her medication list with them to accurately fill what 
she needs to resume once discharged from the hospital. 



Visited with patient about [facility] out of [place] et she indicates that she would like to 
follow up with them and get established. F/U is being made for 1wk after dismissal from the 
hospital. 



Spoke with her daughter Lyn Garcia to notify her that the patient has been discharged from 
the hospital today.

## 2019-11-07 NOTE — NUR
patient walked for 6 min. on room air lowest sat was 92 % no oxygen requiered 


-------------------------------------------------------------------------------

Addendum: 11/07/19 at 1432 by SHERRELL VILLAR RT

-------------------------------------------------------------------------------

Amended: Links added.

## 2020-07-15 ENCOUNTER — HOSPITAL ENCOUNTER (INPATIENT)
Dept: HOSPITAL 75 - ER FS | Age: 75
LOS: 1 days | Discharge: HOME | DRG: 694 | End: 2020-07-16
Attending: INTERNAL MEDICINE | Admitting: INTERNAL MEDICINE
Payer: MEDICARE

## 2020-07-15 VITALS — SYSTOLIC BLOOD PRESSURE: 147 MMHG | DIASTOLIC BLOOD PRESSURE: 69 MMHG

## 2020-07-15 VITALS — DIASTOLIC BLOOD PRESSURE: 66 MMHG | SYSTOLIC BLOOD PRESSURE: 158 MMHG

## 2020-07-15 VITALS — DIASTOLIC BLOOD PRESSURE: 68 MMHG | SYSTOLIC BLOOD PRESSURE: 161 MMHG

## 2020-07-15 VITALS — SYSTOLIC BLOOD PRESSURE: 126 MMHG | DIASTOLIC BLOOD PRESSURE: 61 MMHG

## 2020-07-15 VITALS — SYSTOLIC BLOOD PRESSURE: 144 MMHG | DIASTOLIC BLOOD PRESSURE: 63 MMHG

## 2020-07-15 VITALS — DIASTOLIC BLOOD PRESSURE: 68 MMHG | SYSTOLIC BLOOD PRESSURE: 132 MMHG

## 2020-07-15 VITALS — DIASTOLIC BLOOD PRESSURE: 68 MMHG | SYSTOLIC BLOOD PRESSURE: 146 MMHG

## 2020-07-15 VITALS — SYSTOLIC BLOOD PRESSURE: 163 MMHG | DIASTOLIC BLOOD PRESSURE: 56 MMHG

## 2020-07-15 VITALS — WEIGHT: 119.05 LBS | BODY MASS INDEX: 22.48 KG/M2 | HEIGHT: 60.98 IN

## 2020-07-15 VITALS — SYSTOLIC BLOOD PRESSURE: 154 MMHG | DIASTOLIC BLOOD PRESSURE: 65 MMHG

## 2020-07-15 VITALS — DIASTOLIC BLOOD PRESSURE: 77 MMHG | SYSTOLIC BLOOD PRESSURE: 191 MMHG

## 2020-07-15 VITALS — DIASTOLIC BLOOD PRESSURE: 66 MMHG | SYSTOLIC BLOOD PRESSURE: 155 MMHG

## 2020-07-15 DIAGNOSIS — I25.10: ICD-10-CM

## 2020-07-15 DIAGNOSIS — N18.3: ICD-10-CM

## 2020-07-15 DIAGNOSIS — M54.9: ICD-10-CM

## 2020-07-15 DIAGNOSIS — I48.91: ICD-10-CM

## 2020-07-15 DIAGNOSIS — I42.9: ICD-10-CM

## 2020-07-15 DIAGNOSIS — I50.9: ICD-10-CM

## 2020-07-15 DIAGNOSIS — I13.0: ICD-10-CM

## 2020-07-15 DIAGNOSIS — J44.9: ICD-10-CM

## 2020-07-15 DIAGNOSIS — M19.91: ICD-10-CM

## 2020-07-15 DIAGNOSIS — K21.9: ICD-10-CM

## 2020-07-15 DIAGNOSIS — E03.9: ICD-10-CM

## 2020-07-15 DIAGNOSIS — N13.1: Primary | ICD-10-CM

## 2020-07-15 LAB
ALBUMIN SERPL-MCNC: 4 GM/DL (ref 3.2–4.5)
ALP SERPL-CCNC: 80 U/L (ref 40–136)
ALT SERPL-CCNC: 13 U/L (ref 0–55)
APTT BLD: 31 SEC (ref 24–35)
APTT PPP: (no result) S
BACTERIA #/AREA URNS HPF: NEGATIVE /HPF
BASOPHILS # BLD AUTO: 0 10^3/UL (ref 0–0.1)
BASOPHILS NFR BLD AUTO: 0 % (ref 0–10)
BILIRUB SERPL-MCNC: 0.3 MG/DL (ref 0.1–1)
BILIRUB UR QL STRIP: NEGATIVE
BUN/CREAT SERPL: 17
CALCIUM SERPL-MCNC: 9.4 MG/DL (ref 8.5–10.1)
CHLORIDE SERPL-SCNC: 108 MMOL/L (ref 98–107)
CO2 SERPL-SCNC: 21 MMOL/L (ref 21–32)
CREAT SERPL-MCNC: 1.28 MG/DL (ref 0.6–1.3)
EOSINOPHIL # BLD AUTO: 0.3 10^3/UL (ref 0–0.3)
EOSINOPHIL NFR BLD AUTO: 3 % (ref 0–10)
ERYTHROCYTE [DISTWIDTH] IN BLOOD BY AUTOMATED COUNT: 12.9 % (ref 10–14.5)
FIBRINOGEN PPP-MCNC: (no result) MG/DL
GFR SERPLBLD BASED ON 1.73 SQ M-ARVRAT: 40 ML/MIN
GLUCOSE SERPL-MCNC: 201 MG/DL (ref 70–105)
GLUCOSE UR STRIP-MCNC: NEGATIVE MG/DL
HCT VFR BLD CALC: 39 % (ref 35–52)
HGB BLD-MCNC: 12.5 G/DL (ref 11.5–16)
INR PPP: 1.2 (ref 0.8–1.4)
KETONES UR QL STRIP: NEGATIVE
LEUKOCYTE ESTERASE UR QL STRIP: (no result)
LIPASE SERPL-CCNC: 60 U/L (ref 8–78)
LYMPHOCYTES # BLD AUTO: 2.2 X 10^3 (ref 1–4)
LYMPHOCYTES NFR BLD AUTO: 20 % (ref 12–44)
MANUAL DIFFERENTIAL PERFORMED BLD QL: NO
MCH RBC QN AUTO: 31 PG (ref 25–34)
MCHC RBC AUTO-ENTMCNC: 32 G/DL (ref 32–36)
MCV RBC AUTO: 96 FL (ref 80–99)
MONOCYTES # BLD AUTO: 0.5 X 10^3 (ref 0–1)
MONOCYTES NFR BLD AUTO: 4 % (ref 0–12)
NEUTROPHILS # BLD AUTO: 8.2 X 10^3 (ref 1.8–7.8)
NEUTROPHILS NFR BLD AUTO: 73 % (ref 42–75)
NITRITE UR QL STRIP: NEGATIVE
PH UR STRIP: 6 [PH] (ref 5–9)
PLATELET # BLD: 270 10^3/UL (ref 130–400)
PMV BLD AUTO: 11 FL (ref 7.4–10.4)
POTASSIUM SERPL-SCNC: 4.4 MMOL/L (ref 3.6–5)
PROT SERPL-MCNC: 6.7 GM/DL (ref 6.4–8.2)
PROT UR QL STRIP: (no result)
PROTHROMBIN TIME: 15.2 SEC (ref 12.2–14.7)
RBC #/AREA URNS HPF: (no result) /HPF
SODIUM SERPL-SCNC: 142 MMOL/L (ref 135–145)
SP GR UR STRIP: 1.01 (ref 1.02–1.02)
SQUAMOUS #/AREA URNS HPF: (no result) /HPF
WBC # BLD AUTO: 11.2 10^3/UL (ref 4.3–11)
WBC #/AREA URNS HPF: (no result) /HPF

## 2020-07-15 PROCEDURE — 94640 AIRWAY INHALATION TREATMENT: CPT

## 2020-07-15 PROCEDURE — 81000 URINALYSIS NONAUTO W/SCOPE: CPT

## 2020-07-15 PROCEDURE — 74018 RADEX ABDOMEN 1 VIEW: CPT

## 2020-07-15 PROCEDURE — 83690 ASSAY OF LIPASE: CPT

## 2020-07-15 PROCEDURE — 85730 THROMBOPLASTIN TIME PARTIAL: CPT

## 2020-07-15 PROCEDURE — 94760 N-INVAS EAR/PLS OXIMETRY 1: CPT

## 2020-07-15 PROCEDURE — 0T9680Z DRAINAGE OF RIGHT URETER WITH DRAINAGE DEVICE, VIA NATURAL OR ARTIFICIAL OPENING ENDOSCOPIC: ICD-10-PCS | Performed by: UROLOGY

## 2020-07-15 PROCEDURE — 74176 CT ABD & PELVIS W/O CONTRAST: CPT

## 2020-07-15 PROCEDURE — 80053 COMPREHEN METABOLIC PANEL: CPT

## 2020-07-15 PROCEDURE — 85610 PROTHROMBIN TIME: CPT

## 2020-07-15 PROCEDURE — 36415 COLL VENOUS BLD VENIPUNCTURE: CPT

## 2020-07-15 PROCEDURE — 83605 ASSAY OF LACTIC ACID: CPT

## 2020-07-15 PROCEDURE — 85025 COMPLETE CBC W/AUTO DIFF WBC: CPT

## 2020-07-15 PROCEDURE — 87635 SARS-COV-2 COVID-19 AMP PRB: CPT

## 2020-07-15 PROCEDURE — 76000 FLUOROSCOPY <1 HR PHYS/QHP: CPT

## 2020-07-15 RX ADMIN — CARVEDILOL SCH MG: 12.5 TABLET, FILM COATED ORAL at 20:49

## 2020-07-15 RX ADMIN — SODIUM CHLORIDE SCH MLS/HR: 900 INJECTION, SOLUTION INTRAVENOUS at 05:54

## 2020-07-15 RX ADMIN — SODIUM CHLORIDE SCH MLS/HR: 900 INJECTION, SOLUTION INTRAVENOUS at 16:06

## 2020-07-15 RX ADMIN — SODIUM CHLORIDE SCH MLS/HR: 900 INJECTION, SOLUTION INTRAVENOUS at 20:49

## 2020-07-15 NOTE — OPERATIVE REPORT
DATE OF SERVICE:  07/15/2020



PREOPERATIVE DIAGNOSES:

Right abdominal pain with possible right distal ureteral stone.



POSTOPERATIVE DIAGNOSES:

Right abdominal pain with possible right distal ureteral stone.  However, right

distal ureteral stricture.  No evidence of stone.



OPERATION PERFORMED:

Cystoscopy with right retrograde urogram and insertion of right ureteral stent.



SURGEON:

Dr. Tawil.



ANESTHESIA:

General.



COMPLICATIONS:

None.



DESCRIPTION OF PROCEDURE:

Under satisfactory general anesthesia, the patient in lithotomy position,

genitalia were prepped and draped in the usual sterile fashion.  Cystoscope was

introduced under vision.  The bladder was essentially normal except for sluggish

efflux on the right side.  Using the foroblique lens, I passed a right ureteral

catheter, injected contrast.  There was quite a bit of dilatation looking more

chronic in the whole ureter down to the mid distal portion of the ureter.  There

was no filling defect, no evidence of any stone and there was hangover of the

contrast at the area of the stricture as visualized after removing the catheter.

 I went ahead and inserted a stent all the way up to the right renal pelvis with

no problem and removed the guidewire.  The stent was seen _____ nicely

proximally fluoroscopically and distally endoscopically.  There was quite a bit

of cloudy urine coming out from the holes of the stent.  The bladder was

evacuated and the cystoscope was removed.  The patient tolerated the procedure

and anesthesia well and was sent to recovery room in stable condition.



PLAN:

We will see the effect of the stent on her symptoms and clinical picture.  We

will leave the stent probably for six weeks, _____ before that we will check

again if it made any difference in the hydronephrosis, hydroureter and see the

effect of stenting for this period of time to decide the next step in

management.





Job ID: [id number]

DocumentID: [id number]

Dictated Date:  07/15/2020 11:57:29

Transcription Date: 07/15/2020 16:28:58

Dictated By: ELIAS TAWIL, MD

## 2020-07-15 NOTE — PROGRESS NOTE-POST OPERATIVE
Post-Operative Progess Note


Surgeon (s)/Assistant (s)


Surgeon


ELIAS TAWIL MD


Assistant:  NONE





Pre-Operative Diagnosis


RT ABDOMINAL PAIN WITH POSSIBLE RT DISTAL URETERAL STONE





Post-Operative Diagnosis





SAME, RT DISTAL URETERAL STRICTURE, NO EVIDENCE OF STONE





Procedure & Operative Findings


Date of Procedure


7/15/20


Procedure Performed/Findings


CYSTOSCOPY, RT RETROGRADE UROGRAM AND INSERTION OF RT URETERAL STENT


Anesthesia Type


GENERAL





Estimated Blood Loss


Estimated blood loss (mL):  NONE





Specimens/Packing


Specimens Removed


NONE


Packing:  


NONE











TAWIL,ELIAS A MD               Jul 15, 2020 11:51

## 2020-07-15 NOTE — HISTORY & PHYSICAL-HOSPITALIST
History of Present Illness


HPI/Chief Complaint


Pt is a 75yoCF with a PMH of COPD, HTN, CAD who presented to the ER due to RLQ 

pain. At the time of my exam her PCA had just been started and history is 

somewhat limited by drowsiness. She states that he pain is 6/10 and "in the same

spot" as it was when she came in. Per ER note she presented with RLQ with nausea

and vomiting. She denied any diarrhea or fevers/chills. Symptoms started 

yesterday shortly before arrival. CT Abdomen was done and revealed mod/severe 

hydronephrosis concerning for obstructive nephrolithiasis. Urology was consulted

and plan to take her to the OR today for evaluation.


Source:  patient


Date Seen


7/15/20


Time Seen by a Provider:  08:46


Attending Physician


Yovana Victoria MD


PCP


No,Local Physician


Referring Physician





Date of Admission


Jul 15, 2020 at 05:10





Home Medications & Allergies


Home Medications


Reviewed patient Home Medication Reconciliation performed by pharmacy medication

reconciliations technician and/or nursing.


Patients Allergies have been reviewed.





Allergies





Allergies


Coded Allergies


  No Known Drug Allergies ([id number]/19)








Past Medical-Social-Family Hx


Past Med/Social Hx:  Reviewed Nursing Past Med/Soc Hx


Patient Social History


Alcohol Use:  Denies Use


Recreational Drug Use:  No


Smoking Status:  Unknown if Ever Smoked


2nd Hand Smoke Exposure:  No


Recent Foreign Travel:  No


Contact w/other who traveled:  No


Recent Hopitalizations:  No


Recent Infectious Disease Expo:  No





Immunizations Up To Date


Date of Pneumonia Vaccine:  May 1, 2017





Seasonal Allergies


Seasonal Allergies:  Yes





Past Medical History


Surgeries:  Coronary Stent, Hysterectomy, Pacemaker


Respiratory:  COPD


Currently Using CPAP:  No


Currently Using BIPAP:  No


Cardiac:  Atrial Fibrillation, Cardiomyopathy, Coronary Artery Disease, 

Hypertension, Irregular Heartbeat


Hysterectomy


Gastrointestinal:  Gastroesophageal Reflux, Chronic Diarrhea


Musculoskeletal:  Arthritis, Chronic Back Pain


Endocrine:  Hypothyroidsim


History of Blood Disorders:  No





Family History


Reviewed Nursing Family Hx


Cancer





Review of Systems


ROS-Unable to Obtain:  limited by drowsiness


Constitutional:  No chills, No fever


Cardiovascular:  no symptoms reported


Gastrointestinal:  abdominal pain (RLQ), nausea, vomiting


Genitourinary:  hematuria





Physical Exam


Physical Exam


Vital Signs





Vital Signs - First Documented








 7/15/20





 00:30


 


Temp 36.3


 


Pulse 60


 


Resp 16


 


B/P (MAP) 164/73 (103)


 


Pulse Ox 96


 


O2 Delivery Room Air





Capillary Refill : Less Than 3 Seconds


Height, Weight, BMI


Height: '"


Weight: lbs. oz. kg; 22.00 BMI


Method:


General Appearance:  No Apparent Distress, Thin, Other (drowsy)


HEENT:  PERRL/EOMI, Moist Mucous Membranes


Neck:  Normal Inspection, Supple; No Thyromegaly


Respiratory:  Lungs Clear, No Accessory Muscle Use, No Respiratory Distress


Cardiovascular:  Regular Rate, Rhythm, No Murmur


Gastrointestinal:  Normal Bowel Sounds, Soft, Tenderness (mild diffuse 

tenderness)


Extremity:  Normal Capillary Refill, No Calf Tenderness, No Pedal Edema


Neurologic/Psychiatric:  Alert, Oriented x3 (arouses and answer questions 

appropriately but drowsy), Normal Mood/Affect


Skin:  Normal Color, Warm/Dry





Results


Results/Procedures


Labs


Laboratory Tests


7/15/20 01:05








Patient resulted labs reviewed.


Imaging:  Reviewed Imaging Report


Imaging


                 [facility] [place], [place]





NAME:   LUIS A SHANNON


[facility] REC#:   [id number]


ACCOUNT#:   [id number]


PT STATUS:   ADM IN


:   1945


PHYSICIAN:   BRIANNE ZULETA JR, MD


ADMIT DATE:   07/15/20/4TH


                                   ***Draft***


Date of Exam:07/15/20





CT ABDOMEN/PELVIS WO








EXAM: 


CT Abdomen and Pelvis Without Intravenous Contrast.





INDICATION: Diffuse abdominal pain with nausea and vomiting.





No comparison.





FINDINGS: There is chronic interstitial lung disease noted


bilaterally in the lung bases. No consolidated infiltrates. There


is moderate hydronephrosis of the right kidney and ureter. There


is a 4 mm calculus in the mid distal right ureter. Distal ureter


beyond this is not dilated. Bladder appears normal. The left


kidney and ureter appear normal. There is perinephric fluid


surrounding the right kidney.





Liver appears normal. Gallbladder is absent. Pancreas and spleen


are normal. There are surgical changes about the stomach. The


adrenal glands are not enlarged. Bowel gas pattern appears normal


throughout. No obstructive changes. No bowel wall thickening to


suggest inflammatory process. No free air or free fluid. No


blastic or lytic bony changes. Aorta is densely calcified without


evidence of aneurysm.





IMPRESSION: Obstructive uropathy with moderate severe


hydronephrosis on the right with ureter dilated to the level of


the mid pelvis. There is a 4 mm stone at this level.





These findings are concordant with the preliminary report.





  Dictated on workstation # [id number]








Dict:   07/15/20 [id number]


Trans:   07/15/20 0659


[facility] [phone]





Interpreted by:     JULIÁN TANNER MD


Electronically signed by:





Assessment/Plan


Admission Diagnosis


Obstructive Uropathy with mod/severe Hydronephrosis


Admission Status:  Inpatient Order (span 2 midnights)


Reason for Inpatient Admission:  


see below





Assessment and Plan


Obstructive Uropathy with mod/severe Hydronephrosis


Distal ureteral stone


CKD Stage 3a


   Urology consulted, appreciate recs


   Plan is for OR today for evaluation


   PCA in place- add end tidal monitoring


   Continue IVF





a-fib


CHF


HTN


   continue home meds following surgery


   Hold anticoagulation for now





COPD


   Not currently on oxygen


   Resume home meds





Hypothyroidism


   Continue home meds





Diagnosis/Problems


Diagnosis/Problems





(1) CKD (chronic kidney disease)


Status:  Chronic


Qualifiers:  


   Chronic kidney disease stage:  stage 3 (moderate)  Qualified Codes:  N18.3 - 

Chronic kidney disease, stage 3 (moderate)


(2) Atrial fibrillation


Status:  Chronic


Qualifiers:  


   Atrial fibrillation type:  paroxysmal  Qualified Codes:  I48.0 - Paroxysmal 

atrial fibrillation


(3) Essential (primary) hypertension


Status:  Chronic


(4) Hydronephrosis concurrent with and due to calculi of kidney and ureter


Status:  Acute


(5) Kidney stone


Status:  Acute


(6) Hypothyroidism


Status:  Chronic


Qualifiers:  


   Hypothyroidism type:  unspecified  Qualified Codes:  E03.9 - Hypothyroidism, 

unspecified


(7) CHF (congestive heart failure)


Status:  Chronic


Qualifiers:  


   Heart failure type:  systolic  Heart failure chronicity:  chronic  Qualified 

Codes:  I50.22 - Chronic systolic (congestive) heart failure


(8) CAD (coronary artery disease)


Status:  Chronic


Qualifiers:  


   Coronary Disease-Associated Artery/Lesion type:  native artery  Native vs. 

transplanted heart:  native heart  Associated angina:  without angina  Qualified

Codes:  I25.10 - Atherosclerotic heart disease of native coronary artery without

angina pectoris


(9) COPD (chronic obstructive pulmonary disease)


Status:  Chronic


Qualifiers:  


   COPD type:  chronic bronchitis  Chronic bronchitis type:  unspecified  

Qualified Codes:  J42 - Unspecified chronic bronchitis





Clinical Quality Measures


DVT/VTE Risk/Contraindication:


Risk Factor Score Per Nursin


RFS Level Per Nursing on Admit:  2=Moderate











SAMANTHA WALTON MD              Jul 15, 2020 08:47

## 2020-07-15 NOTE — PROGRESS NOTE-PRE OPERATIVE
Pre-Operative Progress Note


H&P Reviewed


The H&P was reviewed, patient examined and no changes noted.


Date Seen by Provider:  Jul 15, 2020


Time Seen by Provider:  09:28


Date H&P Reviewed:  Jul 15, 2020


Time H&P Reviewed:  09:28


Pre-Operative Diagnosis:  RT ABDOMINAL PAIN WITH POSSIBLE RT DISTAL URETERAL 

STONE











TAWIL,ELIAS A MD               Jul 15, 2020 09:29

## 2020-07-15 NOTE — XMS REPORT
Continuity of Care Document

                             Created on: 07/15/2020



LUIS A SHANNON

External Reference #: [id number]

: 1945

Sex: Female



Demographics





                          Address                   [address] [place]  [address]

 

                          Home Phone                [phone]

 

                          Preferred Language        Unknown

 

                          Marital Status            Unknown

 

                          [employer] Affiliation     Unknown

 

                          Race                      Unknown

 

                          Ethnic Group              Unknown





Author





                          Organization              Unknown

 

                          Address                   Unknown

 

                          Phone                     Unavailable



              



Allergies

      



             Active           Description           Code           Type         

  Severity   

                Reaction           Onset           Reported/Identified          

 

Relationship to Patient                 Clinical Status        

 

                Yes             No Known Drug Allergies           [id number]    

       Drug 

Allergy           Unknown           N/A                             2019  

      

                                                             



                  



Medications

      



There is no data.                  



Problems

      



             Date Dx Coded           Attending           Type           Code    

       

Diagnosis                               Diagnosed By        

 

             2016           TONY PAL            N30.90 

          

Cystitis, unspecified without hematuria                    

 

             2016           TONY PAL            R19.7  

         

Diarrhea, unspecified                            

 

             2016           TONY PAL            R82.99 

          

Other abnormal findings in urine                    

 

             2016           TONY PAL            E04.1  

         

Nontoxic single thyroid nodule                    

 

             2016           TONY PAL            R63.4  

         

Abnormal weight loss                             

 

             2017           TONY PAL            I10    

       

Essential (primary) hypertension                    

 

             2017           TONY PAL            I10    

       

Essential (primary) hypertension                    

 

             2017           TONY PAL            J44.9  

         

Chronic obstructive pulmonary disease, unspecified                    

 

             2019           LIU GREGORY MD           Ot           E03

.9           

HYPOTHYROIDISM, UNSPECIFIED                      

 

                2019           LIU GREGORY MD           Ot           

   E78.00          

                          PURE HYPERCHOLESTEROLEMIA, UNSPECIFIED                

    

 

                2019           LIU GREGORY MD           Ot           

   E83.42          

                          HYPOMAGNESEMIA                     

 

             2019           LIU GREGORY MD           Ot           E87

.6           

HYPOKALEMIA                                      

 

                2019           LIU GREGORY MD           Ot           

   G89.29          

                          OTHER CHRONIC PAIN                    

 

             2019           LIU GREGORY MD           Ot           I08

.1           

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                    

 

             2019           LIU GREGORY MD           Ot           I11

.0           

HYPERTENSIVE HEART DISEASE WITH HEART FA                    

 

                2019           LIU GREGORY MD           Ot           

   I25.10          

                          ATHSCL HEART DISEASE OF NATIVE CORONARY               

      

 

             2019           LIU GREGORY MD           Ot           I25

.2           

OLD MYOCARDIAL INFARCTION                        

 

             2019           LIU GREGORY MD           Ot           I25

.5           

ISCHEMIC CARDIOMYOPATHY                          

 

             2019           LIU GREGORY MD, Ot           I48

.0           

PAROXYSMAL ATRIAL FIBRILLATION                    

 

                2019           LIU GREGORY MD, Ot           

   I50.23          

                          ACUTE ON CHRONIC SYSTOLIC (CONGESTIVE) H              

      

 

             2019           LIU GREGORY MD, Ot           I95

.9           

HYPOTENSION, UNSPECIFIED                         

 

             2019           LIU GREGORY MD, Ot           J44

.1           

CHRONIC OBSTRUCTIVE PULMONARY DISEASE W                     

 

             2019           LIU GREGORY MD, Ot           K21

.9           

GASTRO-ESOPHAGEAL REFLUX DISEASE WITHOUT                    

 

                2019           LIU GREGORY MD, Ot           

   M19.90          

                          UNSPECIFIED OSTEOARTHRITIS, UNSPECIFIED               

      

 

             2019           LIU GREGORY MD, Ot           M54

.9           

DORSALGIA, UNSPECIFIED                           

 

                2019           LIU GREGORY MD, Ot           

   Z91.14          

                          PATIENT'S OTHER NONCOMPLIANCE WITH MEDIC              

      

 

             2019           LIU GREGORY MD, Ot           Z95

.5           

PRESENCE OF CORONARY ANGIOPLASTY IMPLANT                    

 

                2019           LIU GREGORY MD, Ot           

   Z95.810         

                          PRESENCE OF AUTOMATIC (IMPLANTABLE) CARD              

      



                                                                        



Procedures

      



                Code            Description           Performed By           Per

dick On        

 

                                      [id number]                                 ME

ASUREMENT OF CARDIAC 

DEFIBRILLATOR, EX                                               2019      

  



                  



Results

      



                    Test                Result              Range        

 

                                        Complete blood count (CBC) with automate

d white blood cell (WBC) differential - 

19 00:05         

 

                          Blood leukocytes automated count (number/volume)      

     15.6 10*3/uL         

                                        4.3-11.0        

 

                          Blood erythrocytes automated count (number/volume)    

       4.59 10*6/uL       

                                        4.35-5.85        

 

                    Venous blood hemoglobin measurement (mass/volume)           

14.2 g/dL           

11.5-16.0        

 

                    Blood hematocrit (volume fraction)           44 %           

     35-52        

 

                    Automated erythrocyte mean corpuscular volume           95 [

foz_us]           

80-99        

 

                                        Automated erythrocyte mean corpuscular h

emoglobin (mass per erythrocyte)        

                          31 pg                     25-34        

 

                                        Automated erythrocyte mean corpuscular h

emoglobin concentration measurement 

(mass/volume)             33 g/dL                   32-36        

 

                    Automated erythrocyte distribution width ratio           13.

2 %              10.0-

14.5        

 

                    Automated blood platelet count (count/volume)           296 

10*3/uL           

130-400        

 

                          Automated blood platelet mean volume measurement      

     12.0 [foz_us]        

                                        7.4-10.4        

 

                    Automated blood neutrophils/100 leukocytes           68 %   

             42-75       

 

 

                    Automated blood lymphocytes/100 leukocytes           24 %   

             12-44       

 

 

                    Blood monocytes/100 leukocytes           6 %                

 0-12        

 

                    Automated blood eosinophils/100 leukocytes           1 %    

             0-10        

 

                    Automated blood basophils/100 leukocytes           1 %      

           0-10        

 

                    Blood neutrophils automated count (number/volume)           

10.7 10*3           

1.8-7.8        

 

                    Blood lymphocytes automated count (number/volume)           

3.8 10*3            

1.0-4.0        

 

                    Blood monocytes automated count (number/volume)           1.

0 10*3            

0.0-1.0        

 

                    Automated eosinophil count           0.1 10*3/uL           0

.0-0.3        

 

                    Automated blood basophil count (count/volume)           0.1 

10*3/uL           

0.0-0.1        

 

                                        Manual absolute plasma cell count - 11/0

 00:05         

 

                    Blood monocytes/100 leukocytes           8 %                

 NRG        

 

                    Manual blood segmented neutrophils/100 leukocytes           

68 %                NRG  

      

 

                    Manual blood lymphocytes/100 leukocytes           23 %      

          NRG        

 

                    Manual eosinophils/100 leukocytes in nose           1 %     

            NRG        

 

                    Blood erythrocyte morphology finding identification         

  NORMAL              

NRG        

 

                                        Comprehensive metabolic panel - 19

 00:05         

 

                          Serum or plasma sodium measurement (moles/volume)     

      142 mmol/L          

                                        135-145        

 

                          Serum or plasma potassium measurement (moles/volume)  

         3.5 mmol/L       

                                        3.6-5.0        

 

                          Serum or plasma chloride measurement (moles/volume)   

        104 mmol/L        

                                                

 

                    Carbon dioxide           22 mmol/L           21-32        

 

                          Serum or plasma anion gap determination (moles/volume)

           16 mmol/L      

                                        5-14        

 

                          Serum or plasma urea nitrogen measurement (mass/volume

)           15 mg/dL      

                                        7-18        

 

                          Serum or plasma creatinine measurement (mass/volume)  

         1.17 mg/dL       

                                        0.60-1.30        

 

                    Serum or plasma urea nitrogen/creatinine mass ratio         

  13                  NRG 

       

 

                                        Serum or plasma creatinine measurement w

ith calculation of estimated glomerular 

filtration rate           45                        NRG        

 

                    Serum or plasma glucose measurement (mass/volume)           

176 mg/dL           

        

 

                    Serum or plasma calcium measurement (mass/volume)           

9.0 mg/dL           

8.5-10.1        

 

                          Serum or plasma total bilirubin measurement (mass/volu

me)           0.7 mg/dL   

                                        0.1-1.0        

 

                                        Serum or plasma alkaline phosphatase tam

surement (enzymatic activity/volume)    

                          82 U/L                            

 

                                        Serum or plasma aspartate aminotransfera

se measurement (enzymatic 

activity/volume)           14 U/L                    5-34        

 

                                        Serum or plasma alanine aminotransferase

 measurement (enzymatic activity/volume)

                          9 U/L                     0-55        

 

                    Serum or plasma protein measurement (mass/volume)           

6.8 g/dL            

6.4-8.2        

 

                    Serum or plasma albumin measurement (mass/volume)           

3.5 g/dL            

3.2-4.5        

 

                    CALCIUM CORRECTED           9.4 mg/dL           8.5-10.1    

    

 

                                        Magnesium - 19 00:05         

 

                    Magnesium           1.8 mg/dL           1.6-2.4        

 

                                        PT panel in platelet poor plasma by coag

ulation assay - 19 00:05         

 

                          Prothrombin time (PT) in platelet poor plasma by coagu

lation assay           

13.3 s                                  12.2-14.7        

 

                          INR in platelet poor plasma or blood by coagulation as

say           1.0         

                                        0.8-1.4        

 

                                        Activated partial thromboplastin time (a

PTT) in platelet poor plasma 

bycoagulation assay - 19 00:05         

 

                                        Activated partial thromboplastin time (a

PTT) in platelet poor plasma 

bycoagulation assay           33 s                      24-35        

 

                                        TROPONIN I FS - 19 00:05         

 

                    TROPONIN I FS           < 0.30              <0.30        

 

                                        PROBNP FS - 19 00:05         

 

                    PROBNP FS           9776.0 pg/mL           <75.0        

 

                                        Methicillin resistant Staphylococcus aur

eus (MRSA) screening culture - 19 

02:23         

 

                          Methicillin resistant Staphylococcus aureus (MRSA) scr

eening culture           

NEG                                     NRG        

 

                                        Complete urinalysis with reflex to cultu

re - 19 02:31         

 

                    Urine color determination           YELLOW              NRG 

       

 

                    Urine clarity determination           CLEAR               NR

G        

 

                    Urine pH measurement by test strip           6              

     5-9        

 

                    Specific gravity of urine by test strip           1.015     

          1.016-1.022  

      

 

                    Urine protein assay by test strip, semi-quantitative        

   3+                  

NEGATIVE        

 

                    Urine glucose detection by automated test strip           NE

GATIVE            

NEGATIVE        

 

                          Erythrocytes detection in urine sediment by light micr

oscopy           NEGATIVE 

                                        NEGATIVE        

 

                    Urine ketones detection by automated test strip           NE

GATIVE            

NEGATIVE        

 

                    Urine nitrite detection by test strip           NEGATIVE    

        NEGATIVE    

    

 

                    Urine total bilirubin detection by test strip           NEGA

TIVE            

NEGATIVE        

 

                          Urine urobilinogen measurement by automated test strip

 (mass/volume)           4

 mg/dL                                  NORMAL        

 

                    Urine leukocyte esterase detection by dipstick           1+ 

                 NEGATIVE 

       

 

                                        Automated urine sediment erythrocyte cou

nt by microscopy (number/high power 

field)                    NONE                      NRG        

 

                                        Automated urine sediment leukocyte count

 by microscopy (number/high power field)

                           [HPF]                    NRG        

 

                          Bacteria detection in urine sediment by light microsco

py           FEW          

                                        NRG        

 

                                        Squamous epithelial cells detection in u

rine sediment by light microscopy       

                          10-25                     NRG        

 

                          Crystals detection in urine sediment by light microsco

py           NONE         

                                        NRG        

 

                    Casts detection in urine sediment by light microscopy       

    NONE                

NRG        

 

                          Mucus detection in urine sediment by light microscopy 

          NEGATIVE        

                                        NRG        

 

                    Complete urinalysis with reflex to culture           NO     

             NRG        

 

                                        Complete blood count (CBC) with automate

d white blood cell (WBC) differential - 

19 03:15         

 

                          Blood leukocytes automated count (number/volume)      

     11.7 10*3/uL         

                                        4.3-11.0        

 

                          Blood erythrocytes automated count (number/volume)    

       4.42 10*6/uL       

                                        4.35-5.85        

 

                    Venous blood hemoglobin measurement (mass/volume)           

13.6 g/dL           

11.5-16.0        

 

                    Blood hematocrit (volume fraction)           42 %           

     35-52        

 

                    Automated erythrocyte mean corpuscular volume           94 [

foz_us]           

80-99        

 

                                        Automated erythrocyte mean corpuscular h

emoglobin (mass per erythrocyte)        

                          31 pg                     25-34        

 

                                        Automated erythrocyte mean corpuscular h

emoglobin concentration measurement 

(mass/volume)             33 g/dL                   32-36        

 

                    Automated erythrocyte distribution width ratio           13.

4 %              10.0-

14.5        

 

                    Automated blood platelet count (count/volume)           266 

10*3/uL           

130-400        

 

                          Automated blood platelet mean volume measurement      

     11.9 [foz_us]        

                                        7.4-10.4        

 

                    Automated blood neutrophils/100 leukocytes           77 %   

             42-75       

 

 

                    Automated blood lymphocytes/100 leukocytes           18 %   

             12-44       

 

 

                    Blood monocytes/100 leukocytes           5 %                

 0-12        

 

                    Automated blood eosinophils/100 leukocytes           0 %    

             0-10        

 

                    Automated blood basophils/100 leukocytes           0 %      

           0-10        

 

                    Blood neutrophils automated count (number/volume)           

9.0 10*3            

1.8-7.8        

 

                    Blood lymphocytes automated count (number/volume)           

2.1 10*3            

1.0-4.0        

 

                    Blood monocytes automated count (number/volume)           0.

6 10*3            

0.0-1.0        

 

                    Automated eosinophil count           0.0 10*3/uL           0

.0-0.3        

 

                    Automated blood basophil count (count/volume)           0.0 

10*3/uL           

0.0-0.1        

 

                                        Whole blood basic metabolic panel -  03:15         

 

                          Serum or plasma sodium measurement (moles/volume)     

      141 mmol/L          

                                        135-145        

 

                          Serum or plasma potassium measurement (moles/volume)  

         3.5 mmol/L       

                                        3.6-5.0        

 

                          Serum or plasma chloride measurement (moles/volume)   

        108 mmol/L        

                                                

 

                    Carbon dioxide           20 mmol/L           21-32        

 

                          Serum or plasma anion gap determination (moles/volume)

           13 mmol/L      

                                        5-14        

 

                          Serum or plasma urea nitrogen measurement (mass/volume

)           17 mg/dL      

                                        7-18        

 

                          Serum or plasma creatinine measurement (mass/volume)  

         1.23 mg/dL       

                                        0.60-1.30        

 

                    Serum or plasma urea nitrogen/creatinine mass ratio         

  14                  NRG 

       

 

                                        Serum or plasma creatinine measurement w

ith calculation of estimated glomerular 

filtration rate           42                        NRG        

 

                    Serum or plasma glucose measurement (mass/volume)           

185 mg/dL           

        

 

                    Serum or plasma calcium measurement (mass/volume)           

9.1 mg/dL           

8.5-10.1        

 

                                        Serum or plasma phosphate measurement (m

ass/volume) - 19 03:15         

 

                          Serum or plasma phosphate measurement (mass/volume)   

        3.1 mg/dL         

                                        2.3-4.7        

 

                                        Magnesium - 19 03:15         

 

                    Magnesium           1.7 mg/dL           1.6-2.4        

 

                                        THYROID STIMULATING HORMONE - 19 0

3:15         

 

                    THYROID STIMULATING HORMONE           2.01 u[iU]/mL         

  0.35-4.94        

 

                                        Hemoglobin A1c measurement - 19 03

:15         

 

                    Blood hemoglobin A1C measurement (mass/volume)           6.0

 %               4.0-5.6

        

 

                    MEAN BLOOD GLUCOSE           126 %               <=126      

  

 

                                        Capillary blood glucose measurement by g

lucometer (mass/volume) - 19 11:12

         

 

                          Capillary blood glucose measurement by glucometer (mas

s/volume)           149 

mg/dL                                           

 

                                        Complete blood count (CBC) with automate

d white blood cell (WBC) differential - 

19 04:10         

 

                          Blood leukocytes automated count (number/volume)      

     7.0 10*3/uL          

                                        4.3-11.0        

 

                          Blood erythrocytes automated count (number/volume)    

       4.07 10*6/uL       

                                        4.35-5.85        

 

                    Venous blood hemoglobin measurement (mass/volume)           

12.4 g/dL           

11.5-16.0        

 

                    Blood hematocrit (volume fraction)           39 %           

     35-52        

 

                    Automated erythrocyte mean corpuscular volume           95 [

foz_us]           

80-99        

 

                                        Automated erythrocyte mean corpuscular h

emoglobin (mass per erythrocyte)        

                          30 pg                     25-34        

 

                                        Automated erythrocyte mean corpuscular h

emoglobin concentration measurement 

(mass/volume)             32 g/dL                   32-36        

 

                    Automated erythrocyte distribution width ratio           13.

4 %              10.0-

14.5        

 

                    Automated blood platelet count (count/volume)           253 

10*3/uL           

130-400        

 

                          Automated blood platelet mean volume measurement      

     11.3 [[id number]]        

                                        7.4-10.4        

 

                    Automated blood neutrophils/100 leukocytes           51 %   

             42-75       

 

 

                    Automated blood lymphocytes/100 leukocytes           36 %   

             12-44       

 

 

                    Blood monocytes/100 leukocytes           8 %                

 0-12        

 

                    Automated blood eosinophils/100 leukocytes           4 %    

             0-10        

 

                    Automated blood basophils/100 leukocytes           0 %      

           0-10        

 

                    Blood neutrophils automated count (number/volume)           

3.6 10*3            

1.8-7.8        

 

                    Blood lymphocytes automated count (number/volume)           

2.5 10*3            

1.0-4.0        

 

                    Blood monocytes automated count (number/volume)           0.

6 10*3            

0.0-1.0        

 

                    Automated eosinophil count           0.3 10*3/uL           0

.0-0.3        

 

                    Automated blood basophil count (count/volume)           0.0 

10*3/uL           

0.0-0.1        

 

                                        Whole blood basic metabolic panel -  04:10         

 

                          Serum or plasma sodium measurement (moles/volume)     

      140 mmol/L          

                                        135-145        

 

                          Serum or plasma potassium measurement (moles/volume)  

         3.9 mmol/L       

                                        3.6-5.0        

 

                          Serum or plasma chloride measurement (moles/volume)   

        108 mmol/L        

                                                

 

                    Carbon dioxide           22 mmol/L           21-32        

 

                          Serum or plasma anion gap determination (moles/volume)

           10 mmol/L      

                                        5-14        

 

                          Serum or plasma urea nitrogen measurement (mass/volume

)           19 mg/dL      

                                        7-18        

 

                          Serum or plasma creatinine measurement (mass/volume)  

         1.31 mg/dL       

                                        0.60-1.30        

 

                    Serum or plasma urea nitrogen/creatinine mass ratio         

  15                  NRG 

       

 

                                        Serum or plasma creatinine measurement w

ith calculation of estimated glomerular 

filtration rate           39                        NRG        

 

                    Serum or plasma glucose measurement (mass/volume)           

111 mg/dL           

        

 

                    Serum or plasma calcium measurement (mass/volume)           

8.9 mg/dL           

8.5-10.1        

 

                                        Serum or plasma phosphate measurement (m

ass/volume) - 19 04:10         

 

                          Serum or plasma phosphate measurement (mass/volume)   

        3.2 mg/dL         

                                        2.3-4.7        

 

                                        Magnesium - 19 04:10         

 

                    Magnesium           2.1 mg/dL           1.6-2.4        



                                                        



Encounters

      



                ACCT No.           Visit Date/Time           Discharge          

 Status         

             Pt. Type           Provider           Facility           Loc./Unit 

          

Complaint        

 

                [id number]           2017 11:35:00           2017 12:

35:00           

[facility]                                 

 

                [id number]           2017 12:53:00           2017 13:

53:00           

[facility]                                 

 

                [id number]           2016 10:52:00           2016 11:

52:00           

DIS                 [facility]     

                          OT                                 

 

                [id number]           2016 10:14:00           2016 11:

14:00           

DIS                 [facility]     

                          OT                                 

 

                [id number]           2016 13:39:00           2016 14:

39:00           

DIS                 [facility]     

                          OT                                 

 

                    [id number]           2019 01:19:00           

019 16:44:00        

                DIS             Inpatient           COLT GUEVARA, LIU LEZAMA         

  [facility]           [address]                       [facility]

[facility]       

 

 

             [id number]           07/15/2020 05:10:00                        A

CT           

Inpatient                 HUDSON GUEVARA, SHANNAN HORTON           [facility]                 4TH                       [facility] [place]        

 

                [id number]           10/20/2019 12:50:00           10/20/2019 23:59:

59           CLS

                Outpatient           [facility] ALY LAC                          

 [facility] [place] [facility]

## 2020-07-15 NOTE — XMS REPORT
Continuity of Care Document

                             Created on: 07/15/2020



LUIS A SHANNON

External Reference #: [id number]

: 1945

Sex: Female



Demographics





                          Address                   [address] [place]  [address]

 

                          Home Phone                [phone]

 

                          Preferred Language        Unknown

 

                          Marital Status            Unknown

 

                          Confucianist Affiliation     Unknown

 

                          Race                      Unknown

 

                          Ethnic Group              Unknown





Author





                          Organization              Unknown

 

                          Address                   Unknown

 

                          Phone                     Unavailable



              



Allergies

      



             Active           Description           Code           Type         

  Severity   

                Reaction           Onset           Reported/Identified          

 

Relationship to Patient                 Clinical Status        

 

                Yes             No Known Drug Allergies           [id number]    

       Drug 

Allergy           Unknown           N/A                             2019  

      

                                                             



                  



Medications

      



There is no data.                  



Problems

      



             Date Dx Coded           Attending           Type           Code    

       

Diagnosis                               Diagnosed By        

 

             2016           TONY PAL            N30.90 

          

Cystitis, unspecified without hematuria                    

 

             2016           TONY PAL            R19.7  

         

Diarrhea, unspecified                            

 

             2016           TONY PAL            R82.99 

          

Other abnormal findings in urine                    

 

             2016           TONY PAL            E04.1  

         

Nontoxic single thyroid nodule                    

 

             2016           TONY PAL            R63.4  

         

Abnormal weight loss                             

 

             2017           TONY PAL            I10    

       

Essential (primary) hypertension                    

 

             2017           TONY PAL            I10    

       

Essential (primary) hypertension                    

 

             2017           TONY PAL            J44.9  

         

Chronic obstructive pulmonary disease, unspecified                    

 

             2019           LIU GREGORY MD           Ot           E03

.9           

HYPOTHYROIDISM, UNSPECIFIED                      

 

                2019           LIU GREGORY MD           Ot           

   E78.00          

                          PURE HYPERCHOLESTEROLEMIA, UNSPECIFIED                

    

 

                2019           LIU GREGORY MD           Ot           

   E83.42          

                          HYPOMAGNESEMIA                     

 

             2019           LIU GREGORY MD           Ot           E87

.6           

HYPOKALEMIA                                      

 

                2019           LIU GREGORY MD           Ot           

   G89.29          

                          OTHER CHRONIC PAIN                    

 

             2019           LIU GREGORY MD           Ot           I08

.1           

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                    

 

             2019           LIU GREGORY MD           Ot           I11

.0           

HYPERTENSIVE HEART DISEASE WITH HEART FA                    

 

                2019           LIU GREGORY MD           Ot           

   I25.10          

                          ATHSCL HEART DISEASE OF NATIVE CORONARY               

      

 

             2019           LIU GREGORY MD           Ot           I25

.2           

OLD MYOCARDIAL INFARCTION                        

 

             2019           LIU GREGORY MD           Ot           I25

.5           

ISCHEMIC CARDIOMYOPATHY                          

 

             2019           LIU GREGORY MD, Ot           I48

.0           

PAROXYSMAL ATRIAL FIBRILLATION                    

 

                2019           LIU GREGORY MD, Ot           

   I50.23          

                          ACUTE ON CHRONIC SYSTOLIC (CONGESTIVE) H              

      

 

             2019           LIU GREGORY MD, Ot           I95

.9           

HYPOTENSION, UNSPECIFIED                         

 

             2019           LIU GREGORY MD, Ot           J44

.1           

CHRONIC OBSTRUCTIVE PULMONARY DISEASE W                     

 

             2019           LIU GREGORY MD, Ot           K21

.9           

GASTRO-ESOPHAGEAL REFLUX DISEASE WITHOUT                    

 

                2019           LIU GREGORY MD, Ot           

   M19.90          

                          UNSPECIFIED OSTEOARTHRITIS, UNSPECIFIED               

      

 

             2019           LIU GREGORY MD, Ot           M54

.9           

DORSALGIA, UNSPECIFIED                           

 

                2019           LIU GREGORY MD, Ot           

   Z91.14          

                          PATIENT'S OTHER NONCOMPLIANCE WITH MEDIC              

      

 

             2019           LIU GREGORY MD, Ot           Z95

.5           

PRESENCE OF CORONARY ANGIOPLASTY IMPLANT                    

 

                2019           LIU GREGORY MD, Ot           

   Z95.810         

                          PRESENCE OF AUTOMATIC (IMPLANTABLE) CARD              

      



                                                                        



Procedures

      



                Code            Description           Performed By           Per

dick On        

 

                                      [id number]                                 ME

ASUREMENT OF CARDIAC 

DEFIBRILLATOR, EX                                               2019      

  



                  



Results

      



                    Test                Result              Range        

 

                                        Complete blood count (CBC) with automate

d white blood cell (WBC) differential - 

19 00:05         

 

                          Blood leukocytes automated count (number/volume)      

     15.6 10*3/uL         

                                        4.3-11.0        

 

                          Blood erythrocytes automated count (number/volume)    

       4.59 10*6/uL       

                                        4.35-5.85        

 

                    Venous blood hemoglobin measurement (mass/volume)           

14.2 g/dL           

11.5-16.0        

 

                    Blood hematocrit (volume fraction)           44 %           

     35-52        

 

                    Automated erythrocyte mean corpuscular volume           95 [

foz_us]           

80-99        

 

                                        Automated erythrocyte mean corpuscular h

emoglobin (mass per erythrocyte)        

                          31 pg                     25-34        

 

                                        Automated erythrocyte mean corpuscular h

emoglobin concentration measurement 

(mass/volume)             33 g/dL                   32-36        

 

                    Automated erythrocyte distribution width ratio           13.

2 %              10.0-

14.5        

 

                    Automated blood platelet count (count/volume)           296 

10*3/uL           

130-400        

 

                          Automated blood platelet mean volume measurement      

     12.0 [foz_us]        

                                        7.4-10.4        

 

                    Automated blood neutrophils/100 leukocytes           68 %   

             42-75       

 

 

                    Automated blood lymphocytes/100 leukocytes           24 %   

             12-44       

 

 

                    Blood monocytes/100 leukocytes           6 %                

 0-12        

 

                    Automated blood eosinophils/100 leukocytes           1 %    

             0-10        

 

                    Automated blood basophils/100 leukocytes           1 %      

           0-10        

 

                    Blood neutrophils automated count (number/volume)           

10.7 10*3           

1.8-7.8        

 

                    Blood lymphocytes automated count (number/volume)           

3.8 10*3            

1.0-4.0        

 

                    Blood monocytes automated count (number/volume)           1.

0 10*3            

0.0-1.0        

 

                    Automated eosinophil count           0.1 10*3/uL           0

.0-0.3        

 

                    Automated blood basophil count (count/volume)           0.1 

10*3/uL           

0.0-0.1        

 

                                        Manual absolute plasma cell count - 11/0

 00:05         

 

                    Blood monocytes/100 leukocytes           8 %                

 NRG        

 

                    Manual blood segmented neutrophils/100 leukocytes           

68 %                NRG  

      

 

                    Manual blood lymphocytes/100 leukocytes           23 %      

          NRG        

 

                    Manual eosinophils/100 leukocytes in nose           1 %     

            NRG        

 

                    Blood erythrocyte morphology finding identification         

  NORMAL              

NRG        

 

                                        Comprehensive metabolic panel - 19

 00:05         

 

                          Serum or plasma sodium measurement (moles/volume)     

      142 mmol/L          

                                        135-145        

 

                          Serum or plasma potassium measurement (moles/volume)  

         3.5 mmol/L       

                                        3.6-5.0        

 

                          Serum or plasma chloride measurement (moles/volume)   

        104 mmol/L        

                                                

 

                    Carbon dioxide           22 mmol/L           21-32        

 

                          Serum or plasma anion gap determination (moles/volume)

           16 mmol/L      

                                        5-14        

 

                          Serum or plasma urea nitrogen measurement (mass/volume

)           15 mg/dL      

                                        7-18        

 

                          Serum or plasma creatinine measurement (mass/volume)  

         1.17 mg/dL       

                                        0.60-1.30        

 

                    Serum or plasma urea nitrogen/creatinine mass ratio         

  13                  NRG 

       

 

                                        Serum or plasma creatinine measurement w

ith calculation of estimated glomerular 

filtration rate           45                        NRG        

 

                    Serum or plasma glucose measurement (mass/volume)           

176 mg/dL           

        

 

                    Serum or plasma calcium measurement (mass/volume)           

9.0 mg/dL           

8.5-10.1        

 

                          Serum or plasma total bilirubin measurement (mass/volu

me)           0.7 mg/dL   

                                        0.1-1.0        

 

                                        Serum or plasma alkaline phosphatase tam

surement (enzymatic activity/volume)    

                          82 U/L                            

 

                                        Serum or plasma aspartate aminotransfera

se measurement (enzymatic 

activity/volume)           14 U/L                    5-34        

 

                                        Serum or plasma alanine aminotransferase

 measurement (enzymatic activity/volume)

                          9 U/L                     0-55        

 

                    Serum or plasma protein measurement (mass/volume)           

6.8 g/dL            

6.4-8.2        

 

                    Serum or plasma albumin measurement (mass/volume)           

3.5 g/dL            

3.2-4.5        

 

                    CALCIUM CORRECTED           9.4 mg/dL           8.5-10.1    

    

 

                                        Magnesium - 19 00:05         

 

                    Magnesium           1.8 mg/dL           1.6-2.4        

 

                                        PT panel in platelet poor plasma by coag

ulation assay - 19 00:05         

 

                          Prothrombin time (PT) in platelet poor plasma by coagu

lation assay           

13.3 s                                  12.2-14.7        

 

                          INR in platelet poor plasma or blood by coagulation as

say           1.0         

                                        0.8-1.4        

 

                                        Activated partial thromboplastin time (a

PTT) in platelet poor plasma 

bycoagulation assay - 19 00:05         

 

                                        Activated partial thromboplastin time (a

PTT) in platelet poor plasma 

bycoagulation assay           33 s                      24-35        

 

                                        TROPONIN I FS - 19 00:05         

 

                    TROPONIN I FS           < 0.30              <0.30        

 

                                        PROBNP FS - 19 00:05         

 

                    PROBNP FS           9776.0 pg/mL           <75.0        

 

                                        Methicillin resistant Staphylococcus aur

eus (MRSA) screening culture - 19 

02:23         

 

                          Methicillin resistant Staphylococcus aureus (MRSA) scr

eening culture           

NEG                                     NRG        

 

                                        Complete urinalysis with reflex to cultu

re - 19 02:31         

 

                    Urine color determination           YELLOW              NRG 

       

 

                    Urine clarity determination           CLEAR               NR

G        

 

                    Urine pH measurement by test strip           6              

     5-9        

 

                    Specific gravity of urine by test strip           1.015     

          1.016-1.022  

      

 

                    Urine protein assay by test strip, semi-quantitative        

   3+                  

NEGATIVE        

 

                    Urine glucose detection by automated test strip           NE

GATIVE            

NEGATIVE        

 

                          Erythrocytes detection in urine sediment by light micr

oscopy           NEGATIVE 

                                        NEGATIVE        

 

                    Urine ketones detection by automated test strip           NE

GATIVE            

NEGATIVE        

 

                    Urine nitrite detection by test strip           NEGATIVE    

        NEGATIVE    

    

 

                    Urine total bilirubin detection by test strip           NEGA

TIVE            

NEGATIVE        

 

                          Urine urobilinogen measurement by automated test strip

 (mass/volume)           4

 mg/dL                                  NORMAL        

 

                    Urine leukocyte esterase detection by dipstick           1+ 

                 NEGATIVE 

       

 

                                        Automated urine sediment erythrocyte cou

nt by microscopy (number/high power 

field)                    NONE                      NRG        

 

                                        Automated urine sediment leukocyte count

 by microscopy (number/high power field)

                           [HPF]                    NRG        

 

                          Bacteria detection in urine sediment by light microsco

py           FEW          

                                        NRG        

 

                                        Squamous epithelial cells detection in u

rine sediment by light microscopy       

                          10-25                     NRG        

 

                          Crystals detection in urine sediment by light microsco

py           NONE         

                                        NRG        

 

                    Casts detection in urine sediment by light microscopy       

    NONE                

NRG        

 

                          Mucus detection in urine sediment by light microscopy 

          NEGATIVE        

                                        NRG        

 

                    Complete urinalysis with reflex to culture           NO     

             NRG        

 

                                        Complete blood count (CBC) with automate

d white blood cell (WBC) differential - 

19 03:15         

 

                          Blood leukocytes automated count (number/volume)      

     11.7 10*3/uL         

                                        4.3-11.0        

 

                          Blood erythrocytes automated count (number/volume)    

       4.42 10*6/uL       

                                        4.35-5.85        

 

                    Venous blood hemoglobin measurement (mass/volume)           

13.6 g/dL           

11.5-16.0        

 

                    Blood hematocrit (volume fraction)           42 %           

     35-52        

 

                    Automated erythrocyte mean corpuscular volume           94 [

foz_us]           

80-99        

 

                                        Automated erythrocyte mean corpuscular h

emoglobin (mass per erythrocyte)        

                          31 pg                     25-34        

 

                                        Automated erythrocyte mean corpuscular h

emoglobin concentration measurement 

(mass/volume)             33 g/dL                   32-36        

 

                    Automated erythrocyte distribution width ratio           13.

4 %              10.0-

14.5        

 

                    Automated blood platelet count (count/volume)           266 

10*3/uL           

130-400        

 

                          Automated blood platelet mean volume measurement      

     11.9 [foz_us]        

                                        7.4-10.4        

 

                    Automated blood neutrophils/100 leukocytes           77 %   

             42-75       

 

 

                    Automated blood lymphocytes/100 leukocytes           18 %   

             12-44       

 

 

                    Blood monocytes/100 leukocytes           5 %                

 0-12        

 

                    Automated blood eosinophils/100 leukocytes           0 %    

             0-10        

 

                    Automated blood basophils/100 leukocytes           0 %      

           0-10        

 

                    Blood neutrophils automated count (number/volume)           

9.0 10*3            

1.8-7.8        

 

                    Blood lymphocytes automated count (number/volume)           

2.1 10*3            

1.0-4.0        

 

                    Blood monocytes automated count (number/volume)           0.

6 10*3            

0.0-1.0        

 

                    Automated eosinophil count           0.0 10*3/uL           0

.0-0.3        

 

                    Automated blood basophil count (count/volume)           0.0 

10*3/uL           

0.0-0.1        

 

                                        Whole blood basic metabolic panel -  03:15         

 

                          Serum or plasma sodium measurement (moles/volume)     

      141 mmol/L          

                                        135-145        

 

                          Serum or plasma potassium measurement (moles/volume)  

         3.5 mmol/L       

                                        3.6-5.0        

 

                          Serum or plasma chloride measurement (moles/volume)   

        108 mmol/L        

                                                

 

                    Carbon dioxide           20 mmol/L           21-32        

 

                          Serum or plasma anion gap determination (moles/volume)

           13 mmol/L      

                                        5-14        

 

                          Serum or plasma urea nitrogen measurement (mass/volume

)           17 mg/dL      

                                        7-18        

 

                          Serum or plasma creatinine measurement (mass/volume)  

         1.23 mg/dL       

                                        0.60-1.30        

 

                    Serum or plasma urea nitrogen/creatinine mass ratio         

  14                  NRG 

       

 

                                        Serum or plasma creatinine measurement w

ith calculation of estimated glomerular 

filtration rate           42                        NRG        

 

                    Serum or plasma glucose measurement (mass/volume)           

185 mg/dL           

        

 

                    Serum or plasma calcium measurement (mass/volume)           

9.1 mg/dL           

8.5-10.1        

 

                                        Serum or plasma phosphate measurement (m

ass/volume) - 19 03:15         

 

                          Serum or plasma phosphate measurement (mass/volume)   

        3.1 mg/dL         

                                        2.3-4.7        

 

                                        Magnesium - 19 03:15         

 

                    Magnesium           1.7 mg/dL           1.6-2.4        

 

                                        THYROID STIMULATING HORMONE - 19 0

3:15         

 

                    THYROID STIMULATING HORMONE           2.01 u[iU]/mL         

  0.35-4.94        

 

                                        Hemoglobin A1c measurement - 19 03

:15         

 

                    Blood hemoglobin A1C measurement (mass/volume)           6.0

 %               4.0-5.6

        

 

                    MEAN BLOOD GLUCOSE           126 %               <=126      

  

 

                                        Capillary blood glucose measurement by g

lucometer (mass/volume) - 19 11:12

         

 

                          Capillary blood glucose measurement by glucometer (mas

s/volume)           149 

mg/dL                                           

 

                                        Complete blood count (CBC) with automate

d white blood cell (WBC) differential - 

19 04:10         

 

                          Blood leukocytes automated count (number/volume)      

     7.0 10*3/uL          

                                        4.3-11.0        

 

                          Blood erythrocytes automated count (number/volume)    

       4.07 10*6/uL       

                                        4.35-5.85        

 

                    Venous blood hemoglobin measurement (mass/volume)           

12.4 g/dL           

11.5-16.0        

 

                    Blood hematocrit (volume fraction)           39 %           

     35-52        

 

                    Automated erythrocyte mean corpuscular volume           95 [

foz_us]           

80-99        

 

                                        Automated erythrocyte mean corpuscular h

emoglobin (mass per erythrocyte)        

                          30 pg                     25-34        

 

                                        Automated erythrocyte mean corpuscular h

emoglobin concentration measurement 

(mass/volume)             32 g/dL                   32-36        

 

                    Automated erythrocyte distribution width ratio           13.

4 %              10.0-

14.5        

 

                    Automated blood platelet count (count/volume)           253 

10*3/uL           

130-400        

 

                          Automated blood platelet mean volume measurement      

     11.3 [foz_us]        

                                        7.4-10.4        

 

                    Automated blood neutrophils/100 leukocytes           51 %   

             42-75       

 

 

                    Automated blood lymphocytes/100 leukocytes           36 %   

             12-44       

 

 

                    Blood monocytes/100 leukocytes           8 %                

 0-12        

 

                    Automated blood eosinophils/100 leukocytes           4 %    

             0-10        

 

                    Automated blood basophils/100 leukocytes           0 %      

           0-10        

 

                    Blood neutrophils automated count (number/volume)           

3.6 10*3            

1.8-7.8        

 

                    Blood lymphocytes automated count (number/volume)           

2.5 10*3            

1.0-4.0        

 

                    Blood monocytes automated count (number/volume)           0.

6 10*3            

0.0-1.0        

 

                    Automated eosinophil count           0.3 10*3/uL           0

.0-0.3        

 

                    Automated blood basophil count (count/volume)           0.0 

10*3/uL           

0.0-0.1        

 

                                        Whole blood basic metabolic panel -  04:10         

 

                          Serum or plasma sodium measurement (moles/volume)     

      140 mmol/L          

                                        135-145        

 

                          Serum or plasma potassium measurement (moles/volume)  

         3.9 mmol/L       

                                        3.6-5.0        

 

                          Serum or plasma chloride measurement (moles/volume)   

        108 mmol/L        

                                                

 

                    Carbon dioxide           22 mmol/L           21-32        

 

                          Serum or plasma anion gap determination (moles/volume)

           10 mmol/L      

                                        5-14        

 

                          Serum or plasma urea nitrogen measurement (mass/volume

)           19 mg/dL      

                                        7-18        

 

                          Serum or plasma creatinine measurement (mass/volume)  

         1.31 mg/dL       

                                        0.60-1.30        

 

                    Serum or plasma urea nitrogen/creatinine mass ratio         

  15                  NRG 

       

 

                                        Serum or plasma creatinine measurement w

ith calculation of estimated glomerular 

filtration rate           39                        NRG        

 

                    Serum or plasma glucose measurement (mass/volume)           

111 mg/dL           

        

 

                    Serum or plasma calcium measurement (mass/volume)           

8.9 mg/dL           

8.5-10.1        

 

                                        Serum or plasma phosphate measurement (m

ass/volume) - 19 04:10         

 

                          Serum or plasma phosphate measurement (mass/volume)   

        3.2 mg/dL         

                                        2.3-4.7        

 

                                        Magnesium - 19 04:10         

 

                    Magnesium           2.1 mg/dL           1.6-2.4        



                                                        



Encounters

      



                ACCT No.           Visit Date/Time           Discharge          

 Status         

             Pt. Type           Provider           Facility           Loc./Unit 

          

Complaint        

 

                [id number]           2017 11:35:00           2017 12:

35:00           

[facility]     

                          [facility]                                 

 

                [id number]           2017 12:53:00           2017 13:

53:00           

DIS                 [facility]                  [facility]     

                          OT                                 

 

                [id number]           2016 10:52:00           2016 11:

52:00           

DIS                 [facility]     

                          OT                                 

 

                [id number]           2016 10:14:00           2016 11:

14:00           

DIS                 [facility]     

                          OT                                 

 

                [id number]           2016 13:39:00           2016 14:

39:00           

DIS                 [facility]     

                          OT                                 

 

                    [id number]           2019 01:19:00           

019 16:44:00        

                DIS             Inpatient           COLT GUEVARA, LIU LEZAMA         

  [facility]           4TH                       ATRIAL FIBRILLATION WIT

H RVR       

 

 

             [id number]           07/15/2020 00:17:00                        A

CT           

Emergency                 SONG GUEVARA, BRIANNE ATKINSON           [facility]                 ER FS                     VOMITING        

 

                [id number]           10/20/2019 12:50:00           10/20/2019 23:59:

59           CLS

                Outpatient           MAHSA ALY LAC [facility] [place] [facility]

## 2020-07-15 NOTE — NUR
Pt back to room 403 from procedure with Dr. Tawil. Report received from ERIC Waters. Pt 
comfortable and reports no pain or discomfort at this time.

## 2020-07-15 NOTE — NUR
LUIS A SHANNON admitted to room 403-1, with an admitting diagnosis of Kidney Stones, on 
07/15/20 from  ED via EMS, accompanied by EMS.LUIS A SHANNON introduced to surroundings, 
call light, bed controls, phone, TV, temperature control, lights, meal times, smoking 
policy, visitor policy, side rail policy, bathrooms and showers.  Patient Rights given to 
patient in the handbook. LUIS A SHANNON verbalizes understanding that [facility] is not 
responsible for the loss or damage to any personal effects or valuables that are kept in the 
patients posession during their hospitalization. LUIS A SHANNON verbalizes understanding of 
Interdisciplinary Patient Education. Patient and/or family were informed about the Rapid 
Response Team and its purpose.

## 2020-07-15 NOTE — ED GI
General


Chief Complaint:  Abdominal/GI Problems


Stated Complaint:  VOMITING


Nursing Triage Note:  


pt states dissuse abd pain with n/v started 30 minutes pta, states no issues 


with bowel or bladder.  pt states hx of reflux and states she is belching alot


Sepsis Screen:  No Definite Risk





History of Present Illness


Date Seen by Provider:  Jul 15, 2020


Time Seen by Provider:  00:10


Initial Comments


Patient is here with right lower quadrant pain and nausea and vomiting no 

diarrhea no history of stool the day has had a history of ANTONIO/BSO still has her 

appendix stones or gallbladder no vomitus in the blood and no fever no chills 

does have pain in the right lower quadrant.


Timing/Duration:  1-3 Hours


Severity/Quality:  Moderate


Location:  RLQ


Radiation:  No Radiation


Activities at Onset:  None


Modifying Factors:  Worsens With Eating, Worsens With Movement, Worsens With 

Palpation, Worsens With Vomiting


Associated Symptoms:  No Chest Pain; Fatigue, Nausea/Vomiting; No Swelling/Mass 

in Abdomen; Weakness





Allergies and Home Medications


Allergies


Coded Allergies:  


     No Known Drug Allergies (Unverified , 11/6/19)





Home Medications


Acetaminophen 500 Mg Tablet, 1,000 MG PO Q4H PRN for PAIN-MILD, (Reported)


Aspirin 81 Mg Tab.chew, 81 MG PO DAILY


   Prescribed by: SAMANTHA WALTON on 11/7/19 1048


Carvedilol 12.5 Mg Tablet, 25 MG PO BID


   Prescribed by: SAMANTHA WALTON on 11/7/19 1048


Fluticasone Propionate 1 Ea Aero, 2 PUFF IH BID, (Reported)


   LAST FILLED 9-25-19 


Fluticasone Propionate 16 Gm [place].susp, 1 SPRAY NS DAILY, (Reported)


   LAST FILLED 9-20-19 


Furosemide 20 Mg Tablet, 20 MG PO DAILY, (Reported)


   LAST FILLED #30 9-10-19 


Levothyroxine Sodium 50 Mcg Tablet, 50 MCG PO DAILY, (Reported)


   LAST FILLED #30 9-10-19 


Loperamide HCl 2 Mg Capsule, PO UD PRN for DIARRHEA, (Reported)


   TAKE 2 CAPSULES AFTER 1ST LOOSE STOOL THEN 1 CAPSULE AFTER EACH UNFORMED 

STOOL. NOT TO EXCEED 8 CAPS A DAY. 


Omeprazole 20 Mg Capsule.dr, 20 MG PO DAILY, (Reported)


   LAST FILLED #30 9-10-19 


Potassium Chloride 10 Meq Capsule.er, 10 MEQ PO DAILY, (Reported)


   LAST FILLED #30 9-10-19 


Ramipril 2.5 Mg Cap, 2.5 MG PO DAILY


   Prescribed by: SAMANTHA WALTON on 11/7/19 1048


Rivaroxaban 10 Mg Tablet, 10 MG PO DAILY, (Reported)


   LAST FILLED #30 9-10-19 


Sertraline HCl 50 Mg Tablet, 50 MG PO DAILY, (Reported)


   LAST FILLED #30 9-25-19 


Spironolactone 25 Mg Tablet, 25 MG PO DAILY


   Prescribed by: SAMANTHA WALTON on 11/7/19 1048


Trazodone HCl 50 Mg Tablet, 50 MG PO HS, (Reported)


   LAST FILLED #30 9-10-19 





Patient Home Medication List


Home Medication List Reviewed:  Yes





Review of Systems


Review of Systems


Constitutional:  No chills, No fever; malaise, weakness


EENTM:  No Symptoms Reported


Respiratory:  Denies Cough, Denies Shortness of Air


Cardiovascular:  Denies Chest Pain, Denies Irregular Heart Rate


Gastrointestinal:  Abdominal Pain; Denies Blood Streaked Stools, Denies 

Diarrhea; Nausea, Poor Appetite, Vomiting


Genitourinary:  Denies Frequency, Denies Hematuria


Musculoskeletal:  No joint pain, No muscle pain


Skin:  No lesions, No rash





Past Medical-Social-Family Hx


Past Med/Social Hx:  Reviewed Nursing Past Med/Soc Hx


Patient Social History


Alcohol Use:  Denies Use


Recreational Drug Use:  No


Smoking Status:  Never a Smoker


2nd Hand Smoke Exposure:  No


Recent Foreign Travel:  No


Contact w/Someone Who Travel:  No


Recent Infectious Disease Expo:  No


Recent Hopitalizations:  No


Physical Abuse:  No


Sexual Abuse:  No


Mistreated:  No


Fear:  No





Immunizations Up To Date


Date of Pneumonia Vaccine:  Nov 1, 2017





Seasonal Allergies


Seasonal Allergies:  Yes





Past Medical History


Surgeries:  Yes (DEFIB.PLACED.)


Coronary Stent, Hysterectomy, Pacemaker


Respiratory:  Yes


COPD


Currently Using CPAP:  No


Currently Using BIPAP:  No


Cardiac:  Yes (X3 STENTS )


Atrial Fibrillation, Cardiomyopathy, Coronary Artery Disease, Hypertension, 

Irregular Heartbeat


Neurological:  No


GYN History:  Hysterectomy


Genitourinary:  No


Gastrointestinal:  Yes


Gastroesophageal Reflux, Chronic Diarrhea


Musculoskeletal:  Yes


Arthritis, Chronic Back Pain


Endocrine:  No


Hypothyroidsim


HEENT:  No


Cancer:  No


Psychosocial:  No


Integumentary:  No


Blood Disorders:  No





Family Medical History


Cancer





Physical Exam


Vital Signs





Vital Signs - First Documented








 7/15/20





 00:30


 


Temp 36.3


 


Pulse 60


 


Resp 16


 


B/P (MAP) 164/73 (103)


 


Pulse Ox 96


 


O2 Delivery Room Air





Capillary Refill : Less Than 3 Seconds


Height/Weight/BMI


Height: '"


Weight: lbs. oz. kg; 22.00 BMI


Method:


General Appearance:  WD/WN, moderate distress


HEENT:  PERRL/EOMI, normal ENT inspection, TMs normal, pharynx normal


Neck:  non-tender, full range of motion


Respiratory:  chest non-tender, lungs clear


Cardiovascular:  regular rate, rhythm, no edema


Gastrointestinal:  abnormal bowel sounds (hyperactive); No guarding, No rebound;

tenderness (right lower quadrant moderate)


Extremities:  normal range of motion, no pedal edema


Back:  normal inspection, no CVA tenderness


Neurologic/Psychiatric:  no motor/sensory deficits, alert, normal mood/affect, 

oriented x 3


Skin:  normal color, warm/dry, pallor





Focused Exam


Lactate Level


7/15/20 01:05: Lactic Acid Level 0.82





Lactic Acid Level





Laboratory Tests








Test


 7/15/20


01:05


 


Lactic Acid Level


 0.82 MMOL/L


(0.50-2.00)











Progress/Results/Core Measures


Results/Orders


Lab Results





Laboratory Tests








Test


 7/15/20


01:05 7/15/20


01:42 Range/Units


 


 


White Blood Count


 11.2 H


 


 4.3-11.0


10^3/uL


 


Red Blood Count


 4.06 L


 


 4.35-5.85


10^6/uL


 


Hemoglobin 12.5   11.5-16.0  G/DL


 


Hematocrit 39   35-52  %


 


Mean Corpuscular Volume 96   80-99  FL


 


Mean Corpuscular Hemoglobin 31   25-34  PG


 


Mean Corpuscular Hemoglobin


Concent 32 


 


 32-36  G/DL





 


Red Cell Distribution Width 12.9   10.0-14.5  %


 


Platelet Count


 270 


 


 130-400


10^3/uL


 


Mean Platelet Volume 11.0 H  7.4-10.4  FL


 


Neutrophils (%) (Auto) 73   42-75  %


 


Lymphocytes (%) (Auto) 20   12-44  %


 


Monocytes (%) (Auto) 4   0-12  %


 


Eosinophils (%) (Auto) 3   0-10  %


 


Basophils (%) (Auto) 0   0-10  %


 


Neutrophils # (Auto) 8.2 H  1.8-7.8  X 10^3


 


Lymphocytes # (Auto) 2.2   1.0-4.0  X 10^3


 


Monocytes # (Auto) 0.5   0.0-1.0  X 10^3


 


Eosinophils # (Auto)


 0.3 


 


 0.0-0.3


10^3/uL


 


Basophils # (Auto)


 0.0 


 


 0.0-0.1


10^3/uL


 


Prothrombin Time 15.2 H  12.2-14.7  SEC


 


INR Comment 1.2   0.8-1.4  


 


Activated Partial


Thromboplast Time 31 


 


 24-35  SEC





 


Sodium Level 142   135-145  MMOL/L


 


Potassium Level 4.4   3.6-5.0  MMOL/L


 


Chloride Level 108 H    MMOL/L


 


Carbon Dioxide Level 21   21-32  MMOL/L


 


Anion Gap 13   5-14  MMOL/L


 


Blood Urea Nitrogen 22 H  7-18  MG/DL


 


Creatinine


 1.28 


 


 0.60-1.30


MG/DL


 


Estimat Glomerular Filtration


Rate 40 


 


  





 


BUN/Creatinine Ratio 17    


 


Glucose Level 201 H    MG/DL


 


Lactic Acid Level


 0.82 


 


 0.50-2.00


MMOL/L


 


Calcium Level 9.4   8.5-10.1  MG/DL


 


Corrected Calcium 9.4   8.5-10.1  MG/DL


 


Total Bilirubin 0.3   0.1-1.0  MG/DL


 


Aspartate Amino Transf


(AST/SGOT) 15 


 


 5-34  U/L





 


Alanine Aminotransferase


(ALT/SGPT) 13 


 


 0-55  U/L





 


Alkaline Phosphatase 80     U/L


 


Total Protein 6.7   6.4-8.2  GM/DL


 


Albumin 4.0   3.2-4.5  GM/DL


 


Lipase 60   8-78  U/L


 


Urine Color  RED H  


 


Urine Clarity  CLOUDY   


 


Urine pH  6.0  5-9  


 


Urine Specific Gravity  1.015 L 1.016-1.022  


 


Urine Protein  1+ H NEGATIVE  


 


Urine Glucose (UA)  NEGATIVE  NEGATIVE  


 


Urine Ketones  NEGATIVE  NEGATIVE  


 


Urine Nitrite  NEGATIVE  NEGATIVE  


 


Urine Bilirubin  NEGATIVE  NEGATIVE  


 


Urine Urobilinogen  0.2  < = 1.0  MG/DL


 


Urine Leukocyte Esterase  TRACE H NEGATIVE  


 


Urine RBC (Auto)  3+ H NEGATIVE  


 


Urine RBC   H  /HPF


 


Urine WBC  RARE   /HPF


 


Urine Squamous Epithelial


Cells 


 RARE 


  /HPF





 


Urine Crystals  NONE   /LPF


 


Urine Bacteria  NEGATIVE   /HPF


 


Urine Casts  NONE   /LPF


 


Urine Mucus  NEGATIVE   /LPF


 


Urine Culture Indicated  NO   








My Orders





Orders - BRIANNE ZULETA JR, MD


Cbc With Automated Diff (7/15/20 00:53)


Comprehensive Metabolic Panel (7/15/20 00:53)


Protime With Inr (7/15/20 00:53)


Partial Thromboplastin Time (7/15/20 00:53)


Lipase (7/15/20 00:53)


Lactic Acid Analyzer (7/15/20 00:53)


Ns Iv 1000 Ml (Sodium Chloride 0.9%) (7/15/20 01:00)


Ondansetron Injection (Zofran Injectio (7/15/20 01:00)


Iohexol Injection (Omnipaque 350 Mg/Ml 1 (7/15/20 01:00)


Received Contrast (Hold Metformin- Contr (7/15/20 01:00)


Ns (Ivpb) (Sodium Chloride 0.9% Ivpb Bag (7/15/20 01:00)


Ua Culture If Indicated (7/15/20 01:00)


Ct Abdomen/Pelvis Wo (7/15/20 00:53)


Fentanyl  Injection (Sublimaze Injection (7/15/20 02:15)


Ns Iv 1000 Ml (Sodium Chloride 0.9%) (7/15/20 02:45)


Fentanyl  Injection (Sublimaze Injection (7/15/20 03:00)





Medications Given in ED





Current Medications








 Medications  Dose


 Ordered  Sig/Mahesh


 Route  Start Time


 Stop Time Status Last Admin


Dose Admin


 


 Fentanyl Citrate  25 mcg  ONCE  PRN


 IVP  7/15/20 02:15


    7/15/20 02:13


25 MCG


 


 Fentanyl Citrate  25 mcg  ONCE  PRN


 IVP  7/15/20 03:00


    7/15/20 03:10


25 MCG


 


 Ondansetron HCl  4 mg  ONCE  ONCE


 IVP  7/15/20 01:00


 7/15/20 01:01 DC 7/15/20 01:06


4 MG


 


 Sodium Chloride  1,000 ml @ 


 200 mls/hr  Q5H ONCE


 IV  7/15/20 02:45


 7/15/20 07:44  7/15/20 02:36


200 MLS/HR








Vital Signs/I&O











 7/15/20 7/15/20





 00:30 03:08


 


Temp 36.3 


 


Pulse 60 60


 


Resp 16 16


 


B/P (MAP) 164/73 (103) 163/56 (91)


 


Pulse Ox 96 92


 


O2 Delivery Room Air Room Air














Blood Pressure Mean:                    103











Departure


Communication (Admissions)


Time/Spoke to Admitting Phy:  03:50


This point patient continues with pain and i feel like with borderline heart 

function borderline renal function continue use of fluids and continued 

obstruction in her best interest discussed with Dr. Moran will transferred to 

[facility]





Impression





   Primary Impression:  


   Calculus of kidney


Disposition:  09 ADMITTED AS INPATIENT


Condition:  Stable





Admissions


Decision to Admit Reason:  Admit from ER (General)


Decision to Admit/Date:  Jul 15, 2020


Time/Decision to Admit Time:  03:45





Departure-Patient Inst.


Referrals:  


NO,LOCAL PHYSICIAN (PCP/Family)


Primary Care Physician











BRIANNE ZULETA JR, MD        Jul 15, 2020 00:56

## 2020-07-15 NOTE — DIAGNOSTIC IMAGING REPORT
INDICATION: Nephrolithiasis



AP view of the abdomen is obtained. Correlation is made with

 radiograph from CT examination performed earlier in the

day. Evaluation kidneys is somewhat limited due to overlying

bowel. There are rounded calcifications in the pelvis

bilaterally. These likely represent phleboliths although

possibility of distal ureteric stone is not excluded. There is

gaseous distention of small and large bowel. Surgical clips are

seen in the left upper quadrant of the abdomen as well as right

mid abdomen.



IMPRESSION: Numerous pelvic calcifications which likely represent

phleboliths. Possibility of distal ureteric stone is not

excluded.



Dictated by: 



  Dictated on workstation # [id number]

## 2020-07-15 NOTE — CONSULTATION REPORT
DATE OF SERVICE:  07/15/2020



ATTENDING PHYSICIAN:

Dr. Victoria.



SUMMARY:

After reviewing the patient's records, interviewing and examining her and

reviewing her CT scan also with our radiologist, this is a 75-year-old white

lady admitted early this morning with right-sided abdominal pain.  She was found

by CT to have quite a bit of dilatation of the pelvis, of the kidney and the

ureter with a question stone in the distal ureter at the middle part of the

pelvis; however, I could not see the stone and Dr. Guzman would not either, plus

her dilatation is pretty big and point more to a chronic dilatation than acute;

however, still the possibility of stone is there.  She has microscopic

hematuria.  History and physical was reviewed.  Her KUB today is hard to see the

stones because she has quite a bit of phlebolith in the pelvis.



IMPRESSION:

Right-sided abdominal pain with microscopic hematuria and possible right distal

ureteral stone with hydro.



PLAN:

I explained the situation to the patient, what we will do it today, take her to

surgery, do a cystoscopy with right retrograde and see if there is a stone, take

care of it either by ureteroscopy, stone basket lithotripsy or ESWL.  Procedure

was fully explained to the patient and her questions were all answered.





Job ID: [id number]

DocumentID: [id number]

Dictated Date:  07/15/2020 09:35:39

Transcription Date: 07/15/2020 10:13:49

Dictated By: ELIAS TAWIL, MD

## 2020-07-15 NOTE — DIAGNOSTIC IMAGING REPORT
EXAM: 

CT Abdomen and Pelvis Without Intravenous Contrast.



INDICATION: Diffuse abdominal pain with nausea and vomiting.



No comparison.



FINDINGS: There is chronic interstitial lung disease noted

bilaterally in the lung bases. No consolidated infiltrates. There

is moderate hydronephrosis of the right kidney and ureter. There

is a 4 mm calculus in the mid distal right ureter. Distal ureter

beyond this is not dilated. Bladder appears normal. The left

kidney and ureter appear normal. There is perinephric fluid

surrounding the right kidney.



Liver appears normal. Gallbladder is absent. Pancreas and spleen

are normal. There are surgical changes about the stomach. The

adrenal glands are not enlarged. Bowel gas pattern appears normal

throughout. No obstructive changes. No bowel wall thickening to

suggest inflammatory process. No free air or free fluid. No

blastic or lytic bony changes. Aorta is densely calcified without

evidence of aneurysm.



IMPRESSION: Obstructive uropathy with moderate severe

hydronephrosis on the right with ureter dilated to the level of

the mid pelvis. There is a 4 mm stone at this level.



These findings are concordant with the preliminary report.



Dictated by: 



  Dictated on workstation # [id number]

## 2020-07-15 NOTE — ANESTHESIA-GENERAL POST-OP
General


Patient Condition


Mental Status/LOC:  Same as Preop


Cardiovascular:  Satisfactory


Nausea/Vomiting:  Absent


Respiratory:  Satisfactory


Pain:  Controlled


Complications:  Absent





Post Op Complications


Complications


None





Follow Up Care/Instructions


Patient Instructions


None needed.





Anesthesia/Patient Condition


Patient Condition


Patient is doing well, no complaints, stable vital signs, no apparent adverse 

anesthesia problems.   


No complications reported per nursing.











FAZAL WHITE CRNA            Jul 15, 2020 13:31

## 2020-07-15 NOTE — NUR
SPOKE WITH PT (SHE HAD HER MED BOTTLES IN THE ROOM) AND WENT THRU THE EXT MED HISTORY TO 
COMPLETE THE MED REC



THE FOLLOWING MEDS DID NOT SHOW ON THE EXT MED HISTORY- ALL WERE FILLED ON 07-

RAMIPRIL 2.5MG #30/30DS

XARELTO 10MG #30/30DS

LEVOTHYROXINE 50MCG #30/30DS

DIGOXIN 125MCG #30/30DS

POTASSIUM CHLOR 10MEQ #30/30DS



06- FLOVENT  #1



WHEN I LOOKED AT THE PT MED BOTTLES I NOTICED COREG AND POTASSIUM HAD 2 DIFFERENT 
MANUFACTURES IN THE BOTTLE - THERE MAY HAVE BEEN MORE MEDS MIXED BUT THOSE ARE THE ONLY 2 I 
SAW.



OTC MEDS:

ASPIRIN 81MG 

TYLENOL

IMODIUM

## 2020-07-16 VITALS — DIASTOLIC BLOOD PRESSURE: 61 MMHG | SYSTOLIC BLOOD PRESSURE: 131 MMHG

## 2020-07-16 VITALS — DIASTOLIC BLOOD PRESSURE: 60 MMHG | SYSTOLIC BLOOD PRESSURE: 131 MMHG

## 2020-07-16 VITALS — DIASTOLIC BLOOD PRESSURE: 60 MMHG | SYSTOLIC BLOOD PRESSURE: 117 MMHG

## 2020-07-16 LAB
ALBUMIN SERPL-MCNC: 3 GM/DL (ref 3.2–4.5)
ALP SERPL-CCNC: 56 U/L (ref 40–136)
ALT SERPL-CCNC: 11 U/L (ref 0–55)
BASOPHILS # BLD AUTO: 0 10^3/UL (ref 0–0.1)
BASOPHILS NFR BLD AUTO: 0 % (ref 0–10)
BILIRUB SERPL-MCNC: 0.3 MG/DL (ref 0.1–1)
BUN/CREAT SERPL: 17
CALCIUM SERPL-MCNC: 8.3 MG/DL (ref 8.5–10.1)
CHLORIDE SERPL-SCNC: 116 MMOL/L (ref 98–107)
CO2 SERPL-SCNC: 18 MMOL/L (ref 21–32)
CREAT SERPL-MCNC: 1.68 MG/DL (ref 0.6–1.3)
EOSINOPHIL # BLD AUTO: 0 10^3/UL (ref 0–0.3)
EOSINOPHIL NFR BLD AUTO: 0 % (ref 0–10)
ERYTHROCYTE [DISTWIDTH] IN BLOOD BY AUTOMATED COUNT: 13.2 % (ref 10–14.5)
GFR SERPLBLD BASED ON 1.73 SQ M-ARVRAT: 30 ML/MIN
GLUCOSE SERPL-MCNC: 139 MG/DL (ref 70–105)
HCT VFR BLD CALC: 33 % (ref 35–52)
HGB BLD-MCNC: 10.7 G/DL (ref 11.5–16)
LYMPHOCYTES # BLD AUTO: 1.9 X 10^3 (ref 1–4)
LYMPHOCYTES NFR BLD AUTO: 17 % (ref 12–44)
MANUAL DIFFERENTIAL PERFORMED BLD QL: NO
MCH RBC QN AUTO: 31 PG (ref 25–34)
MCHC RBC AUTO-ENTMCNC: 32 G/DL (ref 32–36)
MCV RBC AUTO: 97 FL (ref 80–99)
MONOCYTES # BLD AUTO: 0.8 X 10^3 (ref 0–1)
MONOCYTES NFR BLD AUTO: 7 % (ref 0–12)
NEUTROPHILS # BLD AUTO: 8.5 X 10^3 (ref 1.8–7.8)
NEUTROPHILS NFR BLD AUTO: 76 % (ref 42–75)
PLATELET # BLD: 229 10^3/UL (ref 130–400)
PMV BLD AUTO: 11.9 FL (ref 7.4–10.4)
POTASSIUM SERPL-SCNC: 4.1 MMOL/L (ref 3.6–5)
PROT SERPL-MCNC: 5.3 GM/DL (ref 6.4–8.2)
SODIUM SERPL-SCNC: 143 MMOL/L (ref 135–145)
WBC # BLD AUTO: 11.2 10^3/UL (ref 4.3–11)

## 2020-07-16 RX ADMIN — FLUTICASONE PROPIONATE SCH EA: 110 AEROSOL, METERED RESPIRATORY (INHALATION) at 00:46

## 2020-07-16 RX ADMIN — SODIUM CHLORIDE SCH MLS/HR: 900 INJECTION, SOLUTION INTRAVENOUS at 06:57

## 2020-07-16 RX ADMIN — FLUTICASONE PROPIONATE SCH EA: 110 AEROSOL, METERED RESPIRATORY (INHALATION) at 07:48

## 2020-07-16 RX ADMIN — CARVEDILOL SCH MG: 12.5 TABLET, FILM COATED ORAL at 08:57

## 2020-07-16 NOTE — NUR
CM/SS visited with the patient for transportation home. 



The patient reports that she does not have a way home. She states her family does not 
own/have a car and cannot come get her. CM/SS asked if the patient could financially 
contribute to the Taxi and she stated 20 to 30 dollars but that would put her in a financial 
burden for the month. CM/SS asked how the patient and family get around in town for errands. 
She reports that sometimes they [place] a friends car. CM/SS contacted the patients daughter 
on speaker phone in the room and asked if borrowing a car was an option. She stated no due 
to everyone being at work. It was determined that a Taxi voucher was needed. CM/SS contacted 
[employer] and set up transportation. 

No further needs at this time.

## 2020-07-16 NOTE — DISCHARGE SUMMARY
Diagnosis/Chief Complaint


Date of Admission


Jul 15, 2020 at 05:10


Date of Discharge





Discharge Date:  2020


Admission Diagnosis


Obstructive Uropathy with mod/severe Hydronephrosis


Primary Care


No,Local Physician


Discharge Diagnosis





(1) CKD (chronic kidney disease)


Status:  Chronic


(2) Atrial fibrillation


Status:  Chronic


(3) Essential (primary) hypertension


Status:  Chronic


(4) Hydronephrosis concurrent with and due to calculi of kidney and ureter


Status:  Acute


(5) Kidney stone


Status:  Acute


(6) Hypothyroidism


Status:  Chronic


(7) CHF (congestive heart failure)


Status:  Chronic


(8) CAD (coronary artery disease)


Status:  Chronic


(9) COPD (chronic obstructive pulmonary disease)


Status:  Chronic





Discharge Summary


Procedures/Consulations


Dr Tawil- Urology





Discharge Physical Exam


Allergies:  


Coded Allergies:  


     No Known Drug Allergies (Unverified , 19)


Vitals & I&Os





Vital Signs








  Date Time  Temp Pulse Resp B/P (MAP) Pulse Ox O2 Delivery O2 Flow Rate FiO2


 


20 11:03 37.3 62 18 131/61 93 Room Air  


 


7/15/20 12:21       3 








General Appearance:  No Apparent Distress, WD/WN


Respiratory:  Lungs Clear, No Respiratory Distress


Cardiovascular:  Regular Rate, Rhythm, No Murmur


Neurologic/Psychiatric:  Alert, Oriented x3





Hospital Course


Pt was admitted due to concerns for obstructive nephrolithiasis. She underwent 

cystoscopy and urogram which actually revealed a stricture. Stent was placed and

she was started on IV abx. She did well and all of her pain resolved with stent 

placement. She is to follow up with Dr Tawil as an outpatient and is to continue

on oral antibiotics.


Labs (last 24 hrs)


Patient resulted labs reviewed.


Pending Labs





Imaging:  Reviewed Imaging Report





Discussion & Recommendations


Discharge Planning:  >30 minutes discharge planning





Discharge


Home Medications:





Active Scripts


Active


Cefdinir 300 Mg Capsule 300 Mg PO BID


Reported


Ramipril 2.5 Mg Capsule 2.5 Mg PO DAILY


Digoxin 125 Mcg Tablet 125 Mcg PO DAILY


Amlodipine Besylate 10 Mg Tablet 10 Mg PO DAILY


Aldactone (Spironolactone) 25 Mg Tablet 25 Mg PO DAILY


Carvedilol 12.5 Mg Tablet 12.5 Mg PO BID


Aspirin 81 Mg Tab.chew 81 Mg PO DAILY


Tylenol Extra Strength (Acetaminophen) 500 Mg Tablet 1,000 Mg PO Q4H PRN


Furosemide 20 Mg Tablet 20 Mg PO DAILY


Potassium Chloride 10 Meq Capsule.er 10 Meq PO DAILY


Xarelto (Rivaroxaban) 10 Mg Tablet 10 Mg PO DAILY


Omeprazole 20 Mg Capsule.dr 20 Mg PO DAILY


Trazodone HCl 50 Mg Tablet 50 Mg PO HS


Levothyroxine Sodium 50 Mcg Tablet 50 Mcg PO DAILY


Loperamide (Loperamide HCl) 2 Mg Capsule  PO UD PRN


     TAKE 2 CAPSULES AFTER 1ST LOOSE STOOL THEN 1 CAPSULE AFTER EACH


     UNFORMED STOOL. NOT TO EXCEED 8 CAPS A DAY.


Fluticasone Propionate 16 Gm Spray.susp 1 Spray NSEACH DAILY


Flovent Hfa 110 mcg (Fluticasone Propionate) 1 Ea Aero 2 Puff IH BID


Sertraline HCl 50 Mg Tablet 50 Mg PO DAILY





Instructions to patient/family


Please see electronic discharge instructions given to patient.





Clinical Quality Measures


DVT/VTE Risk/Contraindication:


Risk Factor Score Per Nursin


RFS Level Per Nursing on Admit:  2=Moderate





Problem Qualifiers





(1) CKD (chronic kidney disease):  


Chronic kidney disease stage:  stage 3 (moderate)  Qualified Codes:  N18.3 - 

Chronic kidney disease, stage 3 (moderate)


(2) Atrial fibrillation:  


Atrial fibrillation type:  paroxysmal  Qualified Codes:  I48.0 - Paroxysmal 

atrial fibrillation


(3) Hypothyroidism:  


Hypothyroidism type:  unspecified  Qualified Codes:  E03.9 - Hypothyroidism, 

unspecified


(4) CHF (congestive heart failure):  


Heart failure type:  systolic  Heart failure chronicity:  chronic  Qualified 

Codes:  I50.22 - Chronic systolic (congestive) heart failure


(5) CAD (coronary artery disease):  


Coronary Disease-Associated Artery/Lesion type:  native artery  Native vs. 

transplanted heart:  native heart  Associated angina:  without angina  Qualified

Codes:  I25.10 - Atherosclerotic heart disease of native coronary artery without

angina pectoris


(6) COPD (chronic obstructive pulmonary disease):  


COPD type:  chronic bronchitis  Chronic bronchitis type:  unspecified  Qualified

Codes:  J42 - Unspecified chronic bronchitis








SAMANTHA WALTON MD              2020 09:44

## 2020-07-16 NOTE — DISCHARGE INST-SIMPLE/STANDARD
Discharge Inst-Standard


Discharge Medications


New, Converted or Re-Newed RX:  Transmitted to Pharmacy





Patient Instructions/Follow Up


Plan of Care/Instructions/FU:  


Please continue to take your medications as written. Please follow up with


Dr Tawil as scheduled and with your primary care doctor within the next


week.


Activity as Tolerated:  Yes


Discharge Diet:  Cardiac Diet


Return to The Hospital For:  


Worsening pain, fever, chest pain, shortness of breath, if you feel you


are getting worse.











SAMANTHA WALTON MD              Jul 16, 2020 09:47

## 2020-07-16 NOTE — PROGRESS NOTE - UROLOGY
Progress Note-Urology


Progress Notes/Assess & Plan


Progress/Assessment & Plan


RECOVERED WELL FROM OR. ASYMPTOMATIC. AFEBRILE. OK TO DISCHARGE  MIN ON ABX .

OFFICE IN 3 WEEKS


Final Diagnosis


RT URETERAL OBSTRUCTION











TAWIL,ELIAS A MD               Jul 16, 2020 10:06

## 2020-10-30 ENCOUNTER — HOSPITAL ENCOUNTER (OUTPATIENT)
Dept: HOSPITAL 75 - RAD FS | Age: 75
End: 2020-10-30
Attending: NURSE PRACTITIONER
Payer: MEDICARE

## 2020-10-30 DIAGNOSIS — G43.909: Primary | ICD-10-CM

## 2020-10-30 DIAGNOSIS — M54.5: ICD-10-CM

## 2020-10-30 PROCEDURE — 72100 X-RAY EXAM L-S SPINE 2/3 VWS: CPT

## 2020-10-30 NOTE — DIAGNOSTIC IMAGING REPORT
HISTORY: Fall, back pain



TECHNIQUE: 3 views of the lumbar spine



COMPARISON: 07/15/2020



FINDINGS:

Alignment of the lumbar spine appears normal. Vertebral body

heights and disc heights are generally preserved. There is marked

aortic atherosclerosis. There is mild facet arthropathy at the

lumbosacral junction. Bilateral sacroiliac joints are patent.

There is tubing in the right pelvis, which may represent a

distally migrated ureteral stent.



IMPRESSION:

1. No acute osseous abnormality is seen in the lumbar spine.

2. Tubing in the right pelvis may represent a distally migrated

ureteral stent. Please correlate with clinical findings and

patient history.



Report given and faxed to nurse (Yessenia) at 4:54 PM

10/30/2020/cb



Dictated by: 



  Dictated on workstation # [id number]

## 2021-08-25 ENCOUNTER — HOSPITAL ENCOUNTER (OUTPATIENT)
Dept: HOSPITAL 75 - IHC | Age: 76
End: 2021-08-25
Attending: FAMILY MEDICINE
Payer: MEDICARE

## 2021-08-25 DIAGNOSIS — I50.9: ICD-10-CM

## 2021-08-25 DIAGNOSIS — N18.4: ICD-10-CM

## 2021-08-25 DIAGNOSIS — R82.90: ICD-10-CM

## 2021-08-25 DIAGNOSIS — I13.0: Primary | ICD-10-CM

## 2021-08-25 LAB
ALBUMIN SERPL-MCNC: 3.6 GM/DL (ref 3.2–4.5)
ALP SERPL-CCNC: 64 U/L (ref 40–136)
ALT SERPL-CCNC: 14 U/L (ref 0–55)
APTT PPP: YELLOW S
BACTERIA #/AREA URNS HPF: (no result) /HPF
BASOPHILS # BLD AUTO: 0 10^3/UL (ref 0–0.1)
BASOPHILS NFR BLD AUTO: 0 % (ref 0–10)
BILIRUB SERPL-MCNC: 0.3 MG/DL (ref 0.1–1)
BILIRUB UR QL STRIP: NEGATIVE
BUN/CREAT SERPL: 39
CALCIUM SERPL-MCNC: 9.4 MG/DL (ref 8.5–10.1)
CHLORIDE SERPL-SCNC: 103 MMOL/L (ref 98–107)
CO2 SERPL-SCNC: 14 MMOL/L (ref 21–32)
CREAT SERPL-MCNC: 2.63 MG/DL (ref 0.6–1.3)
EOSINOPHIL # BLD AUTO: 0 10^3/UL (ref 0–0.3)
EOSINOPHIL NFR BLD AUTO: 0 % (ref 0–10)
FIBRINOGEN PPP-MCNC: (no result) MG/DL
GFR SERPLBLD BASED ON 1.73 SQ M-ARVRAT: 18 ML/MIN
GLUCOSE SERPL-MCNC: 125 MG/DL (ref 70–105)
GLUCOSE UR STRIP-MCNC: NEGATIVE MG/DL
HCT VFR BLD CALC: 39 % (ref 35–52)
HGB BLD-MCNC: 12.7 G/DL (ref 11.5–16)
KETONES UR QL STRIP: NEGATIVE
LEUKOCYTE ESTERASE UR QL STRIP: (no result)
LYMPHOCYTES # BLD AUTO: 2 X 10^3 (ref 1–4)
LYMPHOCYTES NFR BLD AUTO: 16 % (ref 12–44)
MANUAL DIFFERENTIAL PERFORMED BLD QL: NO
MCH RBC QN AUTO: 31 PG (ref 25–34)
MCHC RBC AUTO-ENTMCNC: 33 G/DL (ref 32–36)
MCV RBC AUTO: 93 FL (ref 80–99)
MONOCYTES # BLD AUTO: 0.5 X 10^3 (ref 0–1)
MONOCYTES NFR BLD AUTO: 4 % (ref 0–12)
NEUTROPHILS # BLD AUTO: 9.7 X 10^3 (ref 1.8–7.8)
NEUTROPHILS NFR BLD AUTO: 80 % (ref 42–75)
NITRITE UR QL STRIP: POSITIVE
PH UR STRIP: 5.5 [PH] (ref 5–9)
PLATELET # BLD: 322 10^3/UL (ref 130–400)
PMV BLD AUTO: 11.7 FL (ref 9–12.2)
POTASSIUM SERPL-SCNC: 5.3 MMOL/L (ref 3.6–5)
PROT SERPL-MCNC: 6.2 GM/DL (ref 6.4–8.2)
PROT UR QL STRIP: NEGATIVE
RBC #/AREA URNS HPF: (no result) /HPF
SODIUM SERPL-SCNC: 131 MMOL/L (ref 135–145)
SP GR UR STRIP: 1.02 (ref 1.02–1.02)
SQUAMOUS #/AREA URNS HPF: (no result) /HPF
WBC # BLD AUTO: 12.1 10^3/UL (ref 4.3–11)
WBC #/AREA URNS HPF: >100 /HPF

## 2021-08-25 PROCEDURE — 80053 COMPREHEN METABOLIC PANEL: CPT

## 2021-08-25 PROCEDURE — 85025 COMPLETE CBC W/AUTO DIFF WBC: CPT

## 2021-08-25 PROCEDURE — 87088 URINE BACTERIA CULTURE: CPT

## 2021-08-25 PROCEDURE — 81000 URINALYSIS NONAUTO W/SCOPE: CPT
